# Patient Record
Sex: FEMALE | Race: WHITE | HISPANIC OR LATINO | Employment: FULL TIME | URBAN - METROPOLITAN AREA
[De-identification: names, ages, dates, MRNs, and addresses within clinical notes are randomized per-mention and may not be internally consistent; named-entity substitution may affect disease eponyms.]

---

## 2020-01-28 ENCOUNTER — TELEPHONE (OUTPATIENT)
Dept: SCHEDULING | Facility: IMAGING CENTER | Age: 35
End: 2020-01-28

## 2020-01-31 ENCOUNTER — OFFICE VISIT (OUTPATIENT)
Dept: URGENT CARE | Facility: PHYSICIAN GROUP | Age: 35
End: 2020-01-31
Payer: COMMERCIAL

## 2020-01-31 VITALS
WEIGHT: 232.2 LBS | HEIGHT: 62 IN | DIASTOLIC BLOOD PRESSURE: 90 MMHG | BODY MASS INDEX: 42.73 KG/M2 | TEMPERATURE: 98.4 F | SYSTOLIC BLOOD PRESSURE: 142 MMHG | HEART RATE: 80 BPM | RESPIRATION RATE: 18 BRPM | OXYGEN SATURATION: 99 %

## 2020-01-31 DIAGNOSIS — R53.83 FATIGUE, UNSPECIFIED TYPE: ICD-10-CM

## 2020-01-31 DIAGNOSIS — R55 SYNCOPE, UNSPECIFIED SYNCOPE TYPE: ICD-10-CM

## 2020-01-31 PROCEDURE — 99204 OFFICE O/P NEW MOD 45 MIN: CPT | Performed by: PHYSICIAN ASSISTANT

## 2020-01-31 RX ORDER — DROSPIRENONE AND ETHINYL ESTRADIOL 0.03MG-3MG
1 KIT ORAL DAILY
COMMUNITY
End: 2021-05-11

## 2020-02-01 NOTE — PROGRESS NOTES
"Subjective:      Samantha Benavides is a 34 y.o. female who presents with Faint (fatigue, x1 week )    PMH: Reviewed with patient/family member/EPIC.   MEDS:   Current Outpatient Medications:   •  drospirenone-ethinyl estradiol (HAIDER) 3-0.03 MG per tablet, Take 1 Tab by mouth every day., Disp: , Rfl:   ALLERGIES:   Allergies   Allergen Reactions   • Strawberry Anaphylaxis and Hives     SURGHX: No past surgical history on file.  SOCHX:    FH: Reviewed with patient, not pertinent to this visit.           Patient presents with:  Faint: fatigue, x1 week   Pt presents to clinic today with complaint of fainting while riding in her car one week  ago. Pt SO reports that [place] [address] when she noticed that patient appeared to be sleeping but was unable to wake her.  She states this persisted for one hour, then patient began \"looking normal\" again and was talking.  Pt states she has some recollection of this event.  Pt reports history of anemia from very heavy periods and has required blood transfusion in the past due to this anemia.  Pt denies CP or SOB at rest but does endorse GARCIA.  Pt here for \"complete work-up\".      Syncope   This is a new problem. Episode onset: one week ago. Episode frequency: once. The problem has been resolved. She lost consciousness for a period of greater than 5 minutes. The symptoms are aggravated by unknown factors. Associated symptoms include malaise/fatigue. Pertinent negatives include no abdominal pain, aura, chest pain, confusion, diaphoresis, dizziness, fever, headaches, nausea, slurred speech or vomiting. She has tried nothing for the symptoms. There is no history of arrhythmia, CAD, a clotting disorder, CVA, DM, HTN, seizures, a sudden death in family, TIA or vertigo.       Review of Systems   Constitutional: Positive for malaise/fatigue. Negative for diaphoresis and fever.   Cardiovascular: Positive for syncope. Negative for chest pain.   Gastrointestinal: " "Negative for abdominal pain, diarrhea, nausea and vomiting.   Neurological: Negative for dizziness and headaches.   Psychiatric/Behavioral: Negative for confusion.   All other systems reviewed and are negative.         Objective:     /90 (BP Location: Left arm, Patient Position: Sitting, BP Cuff Size: Adult)   Pulse 80   Temp 36.9 °C (98.4 °F) (Temporal)   Resp 18   Ht 1.575 m (5' 2\")   Wt 105.3 kg (232 lb 3.2 oz)   SpO2 99%   BMI 42.47 kg/m²      Physical Exam  Vitals signs and nursing note reviewed. Exam conducted with a chaperone present.   Constitutional:       General: She is not in acute distress.     Appearance: Normal appearance. She is well-developed. She is obese. She is not ill-appearing, toxic-appearing or diaphoretic.   HENT:      Head: Normocephalic and atraumatic.      Right Ear: Tympanic membrane normal.      Left Ear: Tympanic membrane normal.      Nose: Nose normal.      Mouth/Throat:      Lips: Pink.      Mouth: Mucous membranes are moist.      Pharynx: Oropharynx is clear. Uvula midline.   Eyes:      Extraocular Movements: Extraocular movements intact.      Conjunctiva/sclera: Conjunctivae normal.      Pupils: Pupils are equal, round, and reactive to light.   Neck:      Musculoskeletal: Normal range of motion and neck supple.   Cardiovascular:      Rate and Rhythm: Normal rate and regular rhythm.      Pulses: Normal pulses.      Heart sounds: Normal heart sounds.   Pulmonary:      Effort: Pulmonary effort is normal.      Breath sounds: Normal breath sounds.   Abdominal:      General: Bowel sounds are normal.      Palpations: Abdomen is soft.   Musculoskeletal: Normal range of motion.   Skin:     General: Skin is warm and dry.      Capillary Refill: Capillary refill takes less than 2 seconds.   Neurological:      General: No focal deficit present.      Mental Status: She is alert and oriented to person, place, and time.      Gait: Gait normal.   Psychiatric:         Mood and Affect: " "Mood normal.            EKG Interpretation   Interpreted by me   Rhythm: normal sinus   Rate: normal   Axis: normal   Ectopy: none   Conduction: normal   ST Segments: no acute change   T Waves: no acute change   Q Waves: none   Clinical Impression: no acute changes and normal EKG    Scanned into chart   Assessment/Plan:     1. Syncope, unspecified syncope type  EKG - Clinic Performed   2. Fatigue, unspecified type  EKG - Clinic Performed       After EKG was performed, pt was immediately referred to ER for further evaluation as the Urgent Care is not the appropriate clinic for a \"complete workup\" of this issue.  PT significant other will drive pt to [facility] for evaluation.     PT requires evaluation and treatment at a facility that can provide a higher level of care due to acuity of illness/complaint.     PT declined [facility] transport.    PT should follow up with PCP in 1-2 days for re-evaluation if symptoms have not improved.  Discussed red flags and reasons to return to UC or ED.  Pt and/or family verbalized understanding of diagnosis and follow up instructions and was offered informational handout on diagnosis.  PT discharged.     "

## 2020-02-04 ASSESSMENT — ENCOUNTER SYMPTOMS
FEVER: 0
DIAPHORESIS: 0
SYNCOPE: 1
SLURRED SPEECH: 0
DIZZINESS: 0
AURA: 0
NAUSEA: 0
ABDOMINAL PAIN: 0
DIARRHEA: 0
CONFUSION: 0
VOMITING: 0
HEADACHES: 0

## 2020-02-13 ENCOUNTER — OFFICE VISIT (OUTPATIENT)
Dept: URGENT CARE | Facility: MEDICAL CENTER | Age: 35
End: 2020-02-13
Payer: COMMERCIAL

## 2020-02-13 VITALS
BODY MASS INDEX: 42.33 KG/M2 | HEART RATE: 90 BPM | HEIGHT: 62 IN | TEMPERATURE: 98.8 F | DIASTOLIC BLOOD PRESSURE: 78 MMHG | SYSTOLIC BLOOD PRESSURE: 124 MMHG | WEIGHT: 230 LBS | RESPIRATION RATE: 16 BRPM | OXYGEN SATURATION: 98 %

## 2020-02-13 DIAGNOSIS — J06.9 URI WITH COUGH AND CONGESTION: Primary | ICD-10-CM

## 2020-02-13 DIAGNOSIS — R06.2 WHEEZING: ICD-10-CM

## 2020-02-13 PROCEDURE — 99214 OFFICE O/P EST MOD 30 MIN: CPT | Performed by: PHYSICIAN ASSISTANT

## 2020-02-13 RX ORDER — PROMETHAZINE HYDROCHLORIDE AND CODEINE PHOSPHATE 6.25; 1 MG/5ML; MG/5ML
5 SYRUP ORAL 4 TIMES DAILY PRN
Qty: 120 ML | Refills: 0 | Status: SHIPPED | OUTPATIENT
Start: 2020-02-13 | End: 2020-02-17

## 2020-02-13 RX ORDER — METHYLPREDNISOLONE 4 MG/1
TABLET ORAL
Qty: 21 TAB | Refills: 0 | Status: SHIPPED | OUTPATIENT
Start: 2020-02-13 | End: 2020-02-17

## 2020-02-13 RX ORDER — DOXYCYCLINE HYCLATE 100 MG
100 TABLET ORAL 2 TIMES DAILY
Qty: 14 TAB | Refills: 0 | Status: SHIPPED | OUTPATIENT
Start: 2020-02-13 | End: 2020-02-20

## 2020-02-14 PROBLEM — E28.2 PCOS (POLYCYSTIC OVARIAN SYNDROME): Status: ACTIVE | Noted: 2018-08-03

## 2020-02-14 PROBLEM — E88.819 INSULIN RESISTANCE: Status: ACTIVE | Noted: 2018-08-03

## 2020-02-14 RX ORDER — LORAZEPAM 1 MG/1
1 TABLET ORAL
COMMUNITY
Start: 2019-01-31 | End: 2020-07-02

## 2020-02-14 RX ORDER — FERROUS SULFATE 325(65) MG
325 TABLET ORAL
COMMUNITY
Start: 2019-03-09 | End: 2022-04-19

## 2020-02-14 RX ORDER — HYDROCODONE BITARTRATE AND ACETAMINOPHEN 5; 325 MG/1; MG/1
1-2 TABLET ORAL
COMMUNITY
Start: 2019-01-31 | End: 2020-02-24

## 2020-02-14 NOTE — PROGRESS NOTES
Subjective:      Pt is a 34 y.o. female who presents with Cough (x2days, body chills, loss of voice, non-productive cough, chest congestion)            HPI  This is a new problem. PT presents to  clinic today complaining of sore throat, fevers, chills, body aches, watery eyes, pressure in ears, cough, fatigue, runny nose. PT denies CP, SOB, NVD, abdominal pain, joint pain. PT states these symptoms began around 2 days ago. PT states the pain is a 7/10, aching in nature and worse at night.  Pt has not taken any RX medications for this condition. The pt's medication list, problem list, and allergies have been evaluated and reviewed during today's visit.      PMH:  Negative per pt.      PSH:  Negative per pt.      Fam Hx:  the patient's family history is not pertinent to their current complaint      Soc HX:  Social History     Socioeconomic History   • Marital status: Single     Spouse name: Not on file   • Number of children: Not on file   • Years of education: Not on file   • Highest education level: Not on file   Occupational History   • Not on file   Social Needs   • Financial resource strain: Not on file   • Food insecurity     Worry: Not on file     Inability: Not on file   • Transportation needs     Medical: Not on file     Non-medical: Not on file   Tobacco Use   • Smoking status: Never Smoker   • Smokeless tobacco: Never Used   Substance and Sexual Activity   • Alcohol use: Not on file   • Drug use: Not on file   • Sexual activity: Not on file   Lifestyle   • Physical activity     Days per week: Not on file     Minutes per session: Not on file   • Stress: Not on file   Relationships   • Social connections     Talks on phone: Not on file     Gets together: Not on file     Attends Zoroastrianism service: Not on file     Active member of club or organization: Not on file     Attends meetings of clubs or organizations: Not on file     Relationship status: Not on file   • Intimate partner violence     Fear of current  "or ex partner: Not on file     Emotionally abused: Not on file     Physically abused: Not on file     Forced sexual activity: Not on file   Other Topics Concern   • Not on file   Social History Narrative   • Not on file         Medications:    Current Outpatient Medications:   •  doxycycline (VIBRAMYCIN) 100 MG Tab, Take 1 Tab by mouth 2 times a day for 7 days., Disp: 14 Tab, Rfl: 0  •  methylPREDNISolone (MEDROL DOSEPAK) 4 MG Tablet Therapy Pack, Follow schedule on package instructions., Disp: 21 Tab, Rfl: 0  •  promethazine-codeine (PHENERGAN-CODEINE) 6.25-10 MG/5ML Syrup, Take 5 mL by mouth 4 times a day as needed for up to 7 days., Disp: 120 mL, Rfl: 0  •  drospirenone-ethinyl estradiol (HAIDER) 3-0.03 MG per tablet, Take 1 Tab by mouth every day., Disp: , Rfl:       Allergies:  Strawberry    ROS    Constitutional: Positive for chills, fevers, body aches and malaise/fatigue.   HENT: Positive for congestion and sore throat. Negative for ear pain.    Eyes: Negative for blurred vision, double vision and photophobia.   Respiratory: Positive for cough and sputum production. Negative for hemoptysis, shortness of breath and wheezing.    Cardiovascular: Negative for chest pain and palpitations.   Gastrointestinal: Negative for nausea, vomiting, abdominal pain, diarrhea and constipation.   Genitourinary: Negative for dysuria and flank pain.   Musculoskeletal: Negative for falls and myalgias.   Skin: Negative for itching and rash.   Neurological: Positive for headaches. Negative for dizziness and tingling.   Endo/Heme/Allergies: Does not bruise/bleed easily.   Psychiatric/Behavioral: Negative for depression. The patient is not nervous/anxious.           Objective:     /78 (BP Location: Left arm, Patient Position: Sitting, BP Cuff Size: Adult)   Pulse 90   Temp 37.1 °C (98.8 °F) (Temporal)   Resp 16   Ht 1.575 m (5' 2\")   Wt 104.3 kg (230 lb)   SpO2 98%   BMI 42.07 kg/m²      Physical Exam      Constitutional: " PT is oriented to person, place, and time. PT appears well-developed and well-nourished. No distress.   HENT:   Head: Normocephalic and atraumatic.   Right Ear: Hearing, tympanic membrane, external ear and ear canal normal.   Left Ear: Hearing, tympanic membrane, external ear and ear canal normal.   Nose: Mucosal edema, rhinorrhea and sinus tenderness present. Right sinus exhibits frontal sinus tenderness. Left sinus exhibits frontal sinus tenderness.   Mouth/Throat: Uvula is midline. Mucous membranes are pale. Posterior oropharyngeal edema and posterior oropharyngeal erythema with exudate noted on exam.   Eyes: Conjunctivae normal and EOM are normal. Pupils are equal, round, and reactive to light.   Neck: Normal range of motion. Neck supple. No thyromegaly present.   Cardiovascular: Normal rate, regular rhythm, normal heart sounds and intact distal pulses.  Exam reveals no gallop and no friction rub.    No murmur heard.  Pulmonary/Chest: Effort normal and breath sounds normal. No respiratory distress. PT has no wheezes. PT has no rales. PT exhibits no tenderness.   Abdominal: Soft. Bowel sounds are normal. PT exhibits no distension and no mass. There is no tenderness. There is no rebound and no guarding.   Musculoskeletal: Normal range of motion. PT exhibits no edema and no tenderness.   Lymphadenopathy:     PT has no cervical adenopathy.   Neurological: PT is alert and oriented to person, place, and time. PT displays normal reflexes. No cranial nerve deficit. PT exhibits normal muscle tone. Coordination normal.   Skin: Skin is warm and dry. No rash noted. No erythema.   Psychiatric: PT has a normal mood and affect. PT behavior is normal. Judgment and thought content normal.          Assessment/Plan:       1. URI with cough and congestion    - doxycycline (VIBRAMYCIN) 100 MG Tab; Take 1 Tab by mouth 2 times a day for 7 days.  Dispense: 14 Tab; Refill: 0  - methylPREDNISolone (MEDROL DOSEPAK) 4 MG Tablet Therapy  Pack; Follow schedule on package instructions.  Dispense: 21 Tab; Refill: 0  - promethazine-codeine (PHENERGAN-CODEINE) 6.25-10 MG/5ML Syrup; Take 5 mL by mouth 4 times a day as needed for up to 7 days.  Dispense: 120 mL; Refill: 0    2. Wheezing    PT asking for contingency plan with antibiotic therapy  Rest, fluids encouraged.  OTC decongestant for congestion/cough  AVS with medical info given.  Pt was in full understanding and agreement with the plan.  Differential diagnosis, natural history, supportive care, and indications for immediate follow-up discussed. All questions answered. Patient agrees with the plan of care.  Follow-up as needed if symptoms worsen or fail to improve to PCP, Urgent care or Emergency Room.

## 2020-02-17 ENCOUNTER — HOSPITAL ENCOUNTER (OUTPATIENT)
Dept: RADIOLOGY | Facility: MEDICAL CENTER | Age: 35
End: 2020-02-17
Attending: PHYSICIAN ASSISTANT
Payer: COMMERCIAL

## 2020-02-17 ENCOUNTER — OFFICE VISIT (OUTPATIENT)
Dept: URGENT CARE | Facility: MEDICAL CENTER | Age: 35
End: 2020-02-17
Payer: COMMERCIAL

## 2020-02-17 VITALS
DIASTOLIC BLOOD PRESSURE: 82 MMHG | WEIGHT: 223 LBS | RESPIRATION RATE: 18 BRPM | OXYGEN SATURATION: 96 % | TEMPERATURE: 99.2 F | SYSTOLIC BLOOD PRESSURE: 126 MMHG | BODY MASS INDEX: 40.79 KG/M2 | HEART RATE: 98 BPM

## 2020-02-17 DIAGNOSIS — R05.9 COUGH: ICD-10-CM

## 2020-02-17 PROCEDURE — 71046 X-RAY EXAM CHEST 2 VIEWS: CPT

## 2020-02-17 PROCEDURE — 99214 OFFICE O/P EST MOD 30 MIN: CPT | Performed by: PHYSICIAN ASSISTANT

## 2020-02-17 RX ORDER — PREDNISONE 20 MG/1
TABLET ORAL
Qty: 10 TAB | Refills: 0 | Status: SHIPPED | OUTPATIENT
Start: 2020-02-17 | End: 2020-02-24

## 2020-02-17 RX ORDER — BENZONATATE 200 MG/1
200 CAPSULE ORAL 3 TIMES DAILY PRN
Qty: 30 CAP | Refills: 0 | Status: SHIPPED | OUTPATIENT
Start: 2020-02-17 | End: 2020-02-24

## 2020-02-17 NOTE — LETTER
February 17, 2020         Patient: Samantha Benavides   YOB: 1985   Date of Visit: 2/17/2020           To Whom it May Concern:    Samantha Benavides was seen in my clinic on 2/17/2020. Please excuse her from work 2/18-2/20/2020.    If you have any questions or concerns, please don't hesitate to call.        Sincerely,           Ari Brar P.A.-C.  Electronically Signed

## 2020-02-18 NOTE — PATIENT INSTRUCTIONS
Bronchospasm, Adult  A bronchospasm is a spasm or tightening of the airways going into the lungs. During a bronchospasm breathing becomes more difficult because the airways get smaller. When this happens there can be coughing, a whistling sound when breathing (wheezing), and difficulty breathing. Bronchospasm is often associated with asthma, but not all patients who experience a bronchospasm have asthma.  What are the causes?  A bronchospasm is caused by inflammation or irritation of the airways. The inflammation or irritation may be triggered by:  · Allergies (such as to animals, pollen, food, or mold). Allergens that cause bronchospasm may cause wheezing immediately after exposure or many hours later.  · Infection. Viral infections are believed to be the most common cause of bronchospasm.  · Exercise.  · Irritants (such as pollution, cigarette smoke, strong odors, aerosol sprays, and paint fumes).  · Weather changes. Winds increase molds and pollens in the air. Rain refreshes the air by washing irritants out. Cold air may cause inflammation.  · Stress and emotional upset.  What are the signs or symptoms?  · Wheezing.  · Excessive nighttime coughing.  · Frequent or severe coughing with a simple cold.  · Chest tightness.  · Shortness of breath.  How is this diagnosed?  Bronchospasm is usually diagnosed through a history and physical exam. Tests, such as chest X-rays, are sometimes done to look for other conditions.  How is this treated?  · Inhaled medicines can be given to open up your airways and help you breathe. The medicines can be given using either an inhaler or a nebulizer machine.  · Corticosteroid medicines may be given for severe bronchospasm, usually when it is associated with asthma.  Follow these instructions at home:  · Always have a plan prepared for seeking medical care. Know when to call your health care provider and local emergency services (911 in the [place] Know where you can access local  emergency care.  · Only take medicines as directed by your health care provider.  · If you were prescribed an inhaler or nebulizer machine, ask your health care provider to explain how to use it correctly. Always use a spacer with your inhaler if you were given one.  · It is necessary to remain calm during an attack. Try to relax and breathe more slowly.  · Control your home environment in the following ways:  ¨ Change your heating and air conditioning filter at least once a month.  ¨ Limit your use of fireplaces and wood stoves.  ¨ Do not smoke and do not allow smoking in your home.  ¨ Avoid exposure to perfumes and fragrances.  ¨ Get rid of pests (such as roaches and mice) and their droppings.  ¨ Throw away plants if you see mold on them.  ¨ Keep your house clean and dust free.  ¨ Replace carpet with wood, tile, or vinyl diamond. Carpet can trap dander and dust.  ¨ Use allergy-proof pillows, mattress covers, and box spring covers.  ¨ Wash bed sheets and blankets every week in hot water and dry them in a dryer.  ¨ Use blankets that are made of polyester or cotton.  ¨ Wash hands frequently.  Contact a health care provider if:  · You have muscle aches.  · You have chest pain.  · The sputum changes from clear or white to yellow, green, gray, or bloody.  · The sputum you cough up gets thicker.  · There are problems that may be related to the medicine you are given, such as a rash, itching, swelling, or trouble breathing.  Get help right away if:  · You have worsening wheezing and coughing even after taking your prescribed medicines.  · You have increased difficulty breathing.  · You develop severe chest pain.  This information is not intended to replace advice given to you by your health care provider. Make sure you discuss any questions you have with your health care provider.  Document Released: 12/20/2004 Document Revised: 05/25/2017 Document Reviewed: 06/09/2014  [employer] Interactive Patient Education © 2017  [employer]

## 2020-02-20 ASSESSMENT — ENCOUNTER SYMPTOMS
PALPITATIONS: 0
COUGH: 1
SHORTNESS OF BREATH: 0
SORE THROAT: 1
HEMOPTYSIS: 0
WHEEZING: 0
MYALGIAS: 1
FEVER: 0
SPUTUM PRODUCTION: 0
CHILLS: 0

## 2020-02-20 NOTE — PROGRESS NOTES
Subjective:      Samantha Benavides is a 34 y.o. female who presents with Cough (cough getting worse, seen 13th, still taking what was prescribed)            Cough   This is a new problem. The current episode started in the past 7 days. The problem has been unchanged. The cough is non-productive. Associated symptoms include myalgias, nasal congestion and a sore throat. Pertinent negatives include no chest pain, chills, ear pain, fever, hemoptysis, shortness of breath or wheezing. Nothing aggravates the symptoms. She has tried OTC cough suppressant for the symptoms.       Review of Systems   Constitutional: Positive for malaise/fatigue. Negative for chills and fever.   HENT: Positive for congestion and sore throat. Negative for ear pain.    Respiratory: Positive for cough. Negative for hemoptysis, sputum production, shortness of breath and wheezing.    Cardiovascular: Negative for chest pain and palpitations.   Musculoskeletal: Positive for myalgias.   All other systems reviewed and are negative.    PMH:  has no past medical history on file.  MEDS:   Current Outpatient Medications:   •  predniSONE (DELTASONE) 20 MG Tab, Take 40 mg by mouth once daily for 5 days., Disp: 10 Tab, Rfl: 0  •  Hydrocod Polst-CPM Polst ER (TUSSIONEX) 10-8 MG/5ML Suspension Extended Release, Take 5 mL by mouth every 12 hours for 12 days., Disp: 115 mL, Rfl: 0  •  benzonatate (TESSALON) 200 MG capsule, Take 1 Cap by mouth 3 times a day as needed for Cough., Disp: 30 Cap, Rfl: 0  •  LORazepam (ATIVAN) 1 MG Tab, Take 1 mg by mouth., Disp: , Rfl:   •  HYDROcodone-acetaminophen (NORCO) 5-325 MG Tab per tablet, Take 1-2 Tabs by mouth., Disp: , Rfl:   •  ferrous sulfate 325 (65 Fe) MG tablet, Take 325 mg by mouth., Disp: , Rfl:   •  doxycycline (VIBRAMYCIN) 100 MG Tab, Take 1 Tab by mouth 2 times a day for 7 days., Disp: 14 Tab, Rfl: 0  •  drospirenone-ethinyl estradiol (HAIDER) 3-0.03 MG per tablet, Take 1 Tab by mouth every day., Disp: , Rfl:    ALLERGIES:   Allergies   Allergen Reactions   • Strawberry Anaphylaxis and Hives     SURGHX: History reviewed. No pertinent surgical history.  SOCHX:  reports that she has never smoked. She has never used smokeless tobacco.  FH: Family history was reviewed, no pertinent findings to report  Medications, Allergies, and current problem list reviewed today in Epic       Objective:     Blood Pressure 126/82   Pulse 98   Temperature 37.3 °C (99.2 °F) (Temporal)   Respiration 18   Weight 101.2 kg (223 lb)   Oxygen Saturation 96%   Body Mass Index 40.79 kg/m²      Physical Exam  Vitals signs reviewed.   Constitutional:       General: She is not in acute distress.     Appearance: She is well-developed. She is not ill-appearing or toxic-appearing.      Interventions: She is not intubated.  HENT:      Head: Normocephalic and atraumatic.      Right Ear: Hearing, tympanic membrane, ear canal and external ear normal.      Left Ear: Hearing, tympanic membrane, ear canal and external ear normal.      Nose: Nose normal.      Mouth/Throat:      Pharynx: Uvula midline.   Eyes:      General: Lids are normal.      Conjunctiva/sclera: Conjunctivae normal.   Neck:      Musculoskeletal: Full passive range of motion without pain, normal range of motion and neck supple.   Cardiovascular:      Rate and Rhythm: Regular rhythm.      Heart sounds: Normal heart sounds, S1 normal and S2 normal. No murmur. No friction rub. No gallop.    Pulmonary:      Effort: Pulmonary effort is normal. No tachypnea, bradypnea, accessory muscle usage or respiratory distress. She is not intubated.      Breath sounds: Normal breath sounds. No decreased breath sounds, wheezing, rhonchi or rales.   Chest:      Chest wall: No tenderness.   Musculoskeletal: Normal range of motion.   Skin:     General: Skin is warm and dry.   Neurological:      Mental Status: She is alert and oriented to person, place, and time.   Psychiatric:         Speech: Speech normal.          Behavior: Behavior normal.         Thought Content: Thought content normal.         Judgment: Judgment normal.            HISTORY/REASON FOR EXAM:  Cough     TECHNIQUE/EXAM DESCRIPTION:  PA and lateral views of the chest.     COMPARISON:  None     FINDINGS:     The heart is not enlarged. No focal consolidation, pleural effusion or pneumothorax is identified.  Costophrenic angles are clear.           IMPRESSION:     No acute cardiopulmonary process is identified.     Assessment/Plan:   Patient is a 34-year-old female who presents for evaluation of cough.  She was seen 1 week ago for this issue and was diagnosed with a URI.  She was prescribed antibiotics and states that she is not feeling better.  She denies shortness of breath or chest pain.  Vitals within normal limits.  Lungs are clear to auscultation.  Chest x-ray shows no acute cardiopulmonary process.  Likely continuing cough from URI.  Discussed that cough may last up to 4 weeks.  Prescription of cough suppressants and steroids sent to pharmacy.    1. Cough    - DX-CHEST-2 VIEWS; Future  - predniSONE (DELTASONE) 20 MG Tab; Take 40 mg by mouth once daily for 5 days.  Dispense: 10 Tab; Refill: 0  - Hydrocod Polst-CPM Polst ER (TUSSIONEX) 10-8 MG/5ML Suspension Extended Release; Take 5 mL by mouth every 12 hours for 12 days.  Dispense: 115 mL; Refill: 0  - benzonatate (TESSALON) 200 MG capsule; Take 1 Cap by mouth 3 times a day as needed for Cough.  Dispense: 30 Cap; Refill: 0    Differential diagnosis, natural history, supportive care discussed. Follow-up with primary care provider within 7-10 days, emergency room precautions discussed.  Patient and/or family appears understanding of information.  Handout and review of patients diagnosis and treatment was discussed extensively.

## 2020-02-24 ENCOUNTER — OFFICE VISIT (OUTPATIENT)
Dept: MEDICAL GROUP | Facility: LAB | Age: 35
End: 2020-02-24
Payer: COMMERCIAL

## 2020-02-24 VITALS
RESPIRATION RATE: 16 BRPM | DIASTOLIC BLOOD PRESSURE: 78 MMHG | OXYGEN SATURATION: 96 % | WEIGHT: 228 LBS | BODY MASS INDEX: 40.4 KG/M2 | SYSTOLIC BLOOD PRESSURE: 114 MMHG | HEIGHT: 63 IN | TEMPERATURE: 96.7 F | HEART RATE: 78 BPM

## 2020-02-24 DIAGNOSIS — H53.9 VISION CHANGES: Primary | ICD-10-CM

## 2020-02-24 DIAGNOSIS — F32.1 CURRENT MODERATE EPISODE OF MAJOR DEPRESSIVE DISORDER, UNSPECIFIED WHETHER RECURRENT (HCC): ICD-10-CM

## 2020-02-24 DIAGNOSIS — D50.0 IRON DEFICIENCY ANEMIA DUE TO CHRONIC BLOOD LOSS: ICD-10-CM

## 2020-02-24 DIAGNOSIS — F41.9 ANXIETY: ICD-10-CM

## 2020-02-24 DIAGNOSIS — E28.2 PCOS (POLYCYSTIC OVARIAN SYNDROME): ICD-10-CM

## 2020-02-24 PROCEDURE — 99214 OFFICE O/P EST MOD 30 MIN: CPT | Performed by: FAMILY MEDICINE

## 2020-02-24 RX ORDER — LORAZEPAM 1 MG/1
1 TABLET ORAL EVERY 4 HOURS PRN
Qty: 2 TAB | Refills: 0 | Status: SHIPPED | OUTPATIENT
Start: 2020-02-24 | End: 2020-02-25

## 2020-02-24 SDOH — HEALTH STABILITY: MENTAL HEALTH: HOW MANY STANDARD DRINKS CONTAINING ALCOHOL DO YOU HAVE ON A TYPICAL DAY?: 1 OR 2

## 2020-02-24 SDOH — HEALTH STABILITY: MENTAL HEALTH: HOW OFTEN DO YOU HAVE A DRINK CONTAINING ALCOHOL?: MONTHLY OR LESS

## 2020-02-24 SDOH — HEALTH STABILITY: MENTAL HEALTH: HOW OFTEN DO YOU HAVE 6 OR MORE DRINKS ON ONE OCCASION?: NEVER

## 2020-02-24 ASSESSMENT — ENCOUNTER SYMPTOMS
BLOOD IN STOOL: 0
EYE REDNESS: 0
EYE PAIN: 0
CHILLS: 0
DIARRHEA: 0
VOMITING: 0
CONSTIPATION: 0
EYE DISCHARGE: 0
WHEEZING: 0
BLURRED VISION: 1
NAUSEA: 0
SHORTNESS OF BREATH: 0
ABDOMINAL PAIN: 0
PHOTOPHOBIA: 0
PALPITATIONS: 0
DOUBLE VISION: 0
FEVER: 0

## 2020-02-24 ASSESSMENT — PATIENT HEALTH QUESTIONNAIRE - PHQ9
SUM OF ALL RESPONSES TO PHQ QUESTIONS 1-9: 10
CLINICAL INTERPRETATION OF PHQ2 SCORE: 2
5. POOR APPETITE OR OVEREATING: 1 - SEVERAL DAYS

## 2020-02-24 NOTE — PROGRESS NOTES
"Samantha Benavides is a 34 y.o. female here for   Chief Complaint   Patient presents with   • Establish Care      Scared about a autoimmunedisease she may have from her mother \"Susac's\"        HPI:  Samantha is a very pleasant 34 y.o. female.     Mother diagnosed 2006     #Vision Changes  -Patient states that in the last several months she has had difficulty with her vision.  She states that at times she will have what she described as \"pixelated\" images, especially if people.  She states when staring at darker colors or blacks she will lose her depth perception.  -Has been seen by optometrist and has glasses that she wears for driving and other necessary activities.  -Denies any eye pain, floaters, trauma to the eyes.  -Patient does state that her mother was recently diagnosed with Susac's disease and scarring that she to might be developing this as well.  -Denies any jaw claudication, increased headaches, fevers, chills, night sweats, unanticipated weight loss.  -Patient does states she has a history of migraines, occurring approximately once every month.  -Denies any other rashes or lesions on body.    #PCOS   -Longstanding history of PCO S.  She states that diagnosed with PCOS led to several episodes of Salcedo menorrhagia where she had significant amount of bleeding.  2 years ago bleeding was bad enough that required blood transfusion.  Patient continues use iron supplementation.  -Has been on OCPs for control of periods for some time now which she states is helped significantly.  -Denies any excessive bleeding, cramping, changes in mood.    #Anemia:  -Most likely secondary to blood loss given her history of PCOS and mini menorrhagia.  Currently taking iron supplements    #Major depressive disorder:  -Patient states that she has previously been diagnosed with depression; however, is never treated or been on any medication for treatment thereof.  She states that she is doing well.  Continues to have some bouts of " "\"sadness\" but overall feels like she is well controlled.  Discussing her PHQ 9 score with her and discussing treatment including pharmacologic as well as counseling patient refused both at this time.  -Patient denies any suicidal/homicidal ideations.    #Anxiety:  -Patient states is situational anxiety that occurs when having to get blood drawn.  Patient is requesting dose of Ativan for upcoming blood draw.    Current medicines (including changes today)  Current Outpatient Medications   Medication Sig Dispense Refill   • LORazepam (ATIVAN) 1 MG Tab Take 1 mg by mouth.     • ferrous sulfate 325 (65 Fe) MG tablet Take 325 mg by mouth.     • drospirenone-ethinyl estradiol (HAIDER) 3-0.03 MG per tablet Take 1 Tab by mouth every day.       No current facility-administered medications for this visit.      She  has a past medical history of Allergy, Anemia, Anxiety, Blood transfusion without reported diagnosis (2018), Head ache, Migraine, and Urinary tract infection.  She  has no past surgical history on file.  Social History     Tobacco Use   • Smoking status: Never Smoker   • Smokeless tobacco: Never Used   Substance Use Topics   • Alcohol use: Not Currently     Frequency: Monthly or less     Drinks per session: 1 or 2     Binge frequency: Never   • Drug use: Never     Social History     Social History Narrative   • Not on file     Family History   Problem Relation Age of Onset   • Migraines Mother    • Autoimmune Disease Mother 42        Susac's    • No Known Problems Father    • No Known Problems Sister    • No Known Problems Brother      Family Status   Relation Name Status   • Mo  Alive   • Fa  Alive   • Sis  Alive   • Bro  Alive     ROS  Review of Systems   Constitutional: Negative for chills and fever.   HENT: Negative for hearing loss.    Eyes: Positive for blurred vision. Negative for double vision, photophobia, pain, discharge and redness.   Respiratory: Negative for shortness of breath and wheezing.  " "  Cardiovascular: Negative for chest pain and palpitations.   Gastrointestinal: Negative for abdominal pain, blood in stool, constipation, diarrhea, nausea and vomiting.   Skin: Negative for rash.   All other systems reviewed and are negative.       Objective:     /78 (BP Location: Right arm, Patient Position: Sitting, BP Cuff Size: Adult)   Pulse 78   Temp 35.9 °C (96.7 °F) (Temporal)   Resp 16   Ht 1.6 m (5' 3\")   Wt 103.4 kg (228 lb)   SpO2 96%  Body mass index is 40.39 kg/m².  Physical Exam:    Constitutional: Alert, no distress.  Skin: Warm, dry, good turgor, no rashes in visible areas.  Eye: Equal, round and reactive, conjunctiva clear, lids normal.  ENMT: Lips without lesions, good dentition, oropharynx clear. TM's pearly gray with normal light reflexes bilaterally  Neck: Trachea midline, no masses, no thyromegaly. No cervical or supraclavicular lymphadenopathy.  Respiratory: Unlabored respiratory effort, lungs clear to auscultation bilaterally, no wheezes, rales, or ronchi.  Cardiovascular: Normal S1, S2, RRR, no murmur, no edema.  Abdomen: Soft, non-tender, no masses, no hepatosplenomegaly.  Psych: Alert and oriented x3, normal affect and mood.      Assessment and Plan:   The following treatment plan was discussed    1. Vision changes  -Unsure etiology at this time.  Fundal examination was unremarkable today.  Did not check on Snellen chart.  However, given her concerns regarding her family history of vasculitis and recent vision changes we will refer to ophthalmology at this time.  -We will also check a panel of labs for signs of any autoimmune disease.  - Sed Rate; Future  - CRP QUANTITIVE (NON-CARDIAC); Future  - Comp Metabolic Panel; Future  - TSH WITH REFLEX TO FT4; Future  - CBC WITHOUT DIFFERENTIAL; Future  - REFERRAL TO OPHTHALMOLOGY    2. Anxiety  -Ativan tabs were given for upcoming blood draw (see above).  - LORazepam (ATIVAN) 1 MG Tab; Take 1 Tab by mouth every four hours as needed " for Anxiety for up to 1 day.  Dispense: 2 Tab; Refill: 0    3. Current moderate episode of major depressive disorder, unspecified whether recurrent (HCC)  -At this time patient does not wish to undergo any treatment for depression and she feels like the symptoms are controlled well at this time.  No suicidal/homicidal ideations.  -Patient was instructed to follow-up immediately if she feels like her symptoms are becoming out of control.  - Patient has been identified as having a positive depression screening. Appropriate orders and counseling have been given.    4. PCOS (polycystic ovarian syndrome)  -Status: Stable.  Will continue on OCPs at this time.    5. Iron deficiency anemia due to chronic blood loss  -Status: Stable.  Will continue with the iron supplementation at this time.  We will recheck blood work at this time.     Records requested.  Followup: Return in about 6 months (around 8/24/2020), or if symptoms worsen or fail to improve.         This note was created using voice recognition software. I have made every reasonable attempt to correct errors, however, I do anticipate some grammatical errors.

## 2020-06-25 ENCOUNTER — HOSPITAL ENCOUNTER (OUTPATIENT)
Dept: LAB | Facility: MEDICAL CENTER | Age: 35
End: 2020-06-25
Attending: FAMILY MEDICINE
Payer: COMMERCIAL

## 2020-06-25 LAB
ERYTHROCYTE [DISTWIDTH] IN BLOOD BY AUTOMATED COUNT: 41.7 FL (ref 35.9–50)
HCT VFR BLD AUTO: 43.4 % (ref 37–47)
HGB BLD-MCNC: 14.2 G/DL (ref 12–16)
MCH RBC QN AUTO: 28.9 PG (ref 27–33)
MCHC RBC AUTO-ENTMCNC: 32.7 G/DL (ref 33.6–35)
MCV RBC AUTO: 88.4 FL (ref 81.4–97.8)
PLATELET # BLD AUTO: 365 K/UL (ref 164–446)
PMV BLD AUTO: 10.6 FL (ref 9–12.9)
RBC # BLD AUTO: 4.91 M/UL (ref 4.2–5.4)
WBC # BLD AUTO: 7.3 K/UL (ref 4.8–10.8)

## 2020-06-25 PROCEDURE — 84443 ASSAY THYROID STIM HORMONE: CPT

## 2020-06-25 PROCEDURE — 36415 COLL VENOUS BLD VENIPUNCTURE: CPT

## 2020-06-25 PROCEDURE — 86140 C-REACTIVE PROTEIN: CPT

## 2020-06-25 PROCEDURE — 80053 COMPREHEN METABOLIC PANEL: CPT

## 2020-06-25 PROCEDURE — 85652 RBC SED RATE AUTOMATED: CPT

## 2020-06-25 PROCEDURE — 85027 COMPLETE CBC AUTOMATED: CPT

## 2020-06-26 LAB
ALBUMIN SERPL BCP-MCNC: 3.9 G/DL (ref 3.2–4.9)
ALBUMIN/GLOB SERPL: 1.1 G/DL
ALP SERPL-CCNC: 58 U/L (ref 30–99)
ALT SERPL-CCNC: 14 U/L (ref 2–50)
ANION GAP SERPL CALC-SCNC: 15 MMOL/L (ref 7–16)
AST SERPL-CCNC: 23 U/L (ref 12–45)
BILIRUB SERPL-MCNC: <0.2 MG/DL (ref 0.1–1.5)
BUN SERPL-MCNC: 8 MG/DL (ref 8–22)
CALCIUM SERPL-MCNC: 9.4 MG/DL (ref 8.5–10.5)
CHLORIDE SERPL-SCNC: 104 MMOL/L (ref 96–112)
CO2 SERPL-SCNC: 19 MMOL/L (ref 20–33)
CREAT SERPL-MCNC: 0.69 MG/DL (ref 0.5–1.4)
CRP SERPL HS-MCNC: 1.47 MG/DL (ref 0–0.75)
ERYTHROCYTE [SEDIMENTATION RATE] IN BLOOD BY WESTERGREN METHOD: 22 MM/HOUR (ref 0–20)
FASTING STATUS PATIENT QL REPORTED: NORMAL
GLOBULIN SER CALC-MCNC: 3.4 G/DL (ref 1.9–3.5)
GLUCOSE SERPL-MCNC: 85 MG/DL (ref 65–99)
POTASSIUM SERPL-SCNC: 4.3 MMOL/L (ref 3.6–5.5)
PROT SERPL-MCNC: 7.3 G/DL (ref 6–8.2)
SODIUM SERPL-SCNC: 138 MMOL/L (ref 135–145)
TSH SERPL DL<=0.005 MIU/L-ACNC: 1.89 UIU/ML (ref 0.38–5.33)

## 2020-07-02 ENCOUNTER — OFFICE VISIT (OUTPATIENT)
Dept: MEDICAL GROUP | Facility: LAB | Age: 35
End: 2020-07-02
Payer: COMMERCIAL

## 2020-07-02 VITALS
RESPIRATION RATE: 16 BRPM | HEIGHT: 63 IN | HEART RATE: 68 BPM | WEIGHT: 230 LBS | DIASTOLIC BLOOD PRESSURE: 72 MMHG | OXYGEN SATURATION: 98 % | TEMPERATURE: 97.2 F | BODY MASS INDEX: 40.75 KG/M2 | SYSTOLIC BLOOD PRESSURE: 106 MMHG

## 2020-07-02 DIAGNOSIS — G89.29 CHRONIC NONINTRACTABLE HEADACHE, UNSPECIFIED HEADACHE TYPE: ICD-10-CM

## 2020-07-02 DIAGNOSIS — H53.9 VISION CHANGES: ICD-10-CM

## 2020-07-02 DIAGNOSIS — R51.9 CHRONIC NONINTRACTABLE HEADACHE, UNSPECIFIED HEADACHE TYPE: ICD-10-CM

## 2020-07-02 DIAGNOSIS — Z83.2 FAMILY HISTORY OF AUTOIMMUNE DISORDER: ICD-10-CM

## 2020-07-02 DIAGNOSIS — F41.9 ANXIETY: ICD-10-CM

## 2020-07-02 PROCEDURE — 99214 OFFICE O/P EST MOD 30 MIN: CPT | Performed by: NURSE PRACTITIONER

## 2020-07-02 RX ORDER — LORAZEPAM 1 MG/1
1 TABLET ORAL EVERY 6 HOURS PRN
Qty: 3 TAB | Refills: 0 | Status: SHIPPED | OUTPATIENT
Start: 2020-07-02 | End: 2020-09-30 | Stop reason: SDUPTHER

## 2020-07-02 RX ORDER — SUMATRIPTAN 25 MG/1
25 TABLET, FILM COATED ORAL
Qty: 10 TAB | Refills: 3 | Status: SHIPPED | OUTPATIENT
Start: 2020-07-02 | End: 2021-05-11

## 2020-07-02 ASSESSMENT — FIBROSIS 4 INDEX: FIB4 SCORE: 0.54

## 2020-07-02 NOTE — PROGRESS NOTES
"CC  f/u    HPI   Samantha is a 34-year-old female, established patient of Dr. Cheema, here to follow-up on lab work and symptoms - new issue to me today.  Requesting a referral to a neurologist.  Concerned that she has an autoimmune disease b/c vision has a \"snow appearance,\" with black spots, light sensitivity, poor night vision (quick onset over a few years per pt) -vision changes such as snow appearance/black spots/overall daytime decrease in acuity started several months ago.  Had to recently increase size icons on computer.    Had negative w/u with ophthalmology in march tells me that she was told to see a neurologist.  Denies issues writing, typing, steering, walking.    Has headache every week lasting a few days - started elementary school.  No previous w/u/treatment for migraines.  Romeo helps occasionally.  Tells me that she is never had a CT or MRI of her brain.  Fatigued easily.  Denies n/v/d or upper abdominal pain.   Denies dysuria or hematuria.   Has chronic wrist, thumb and bilateral hip joint pain.   Has dx of endometriosis.      Social:   Massage therapist  Nonsmoker.   Rare etoh    Family:  Mom-susac syndrome    Past medical, surgical, family, and social history is reviewed and updated in Epic chart by me today.   Medications and allergies reviewed and updated in Epic chart by me today.     ROS:   Denies CP or SOB    Exam:  /72 (BP Location: Left arm, Patient Position: Sitting, BP Cuff Size: Large adult)   Pulse 68   Temp 36.2 °C (97.2 °F)   Resp 16   Ht 1.6 m (5' 3\")   Wt 104.3 kg (230 lb)   SpO2 98%   Constitutional: Alert, no distress, plus 3 vital signs  Skin:  Warm, dry, no rashes invisible areas  Eye: Equal, round and reactive, conjunctiva clear  ENMT: Lips without lesions, good dentition, oropharynx clear    Neck: Trachea midline, no masses, no thyromegaly  Respiratory: Unlabored respiration, lungs clear to auscultation, no wheezes, no rhonchi  Cardiovascular: Normal rate " and rhythm.  Psych: Alert, pleasant, well-groomed, normal affect  Neurologic: Alert and oriented. Cranial nerves II-XII intact, EOMs intact, no tongue deviation, PERRL, no facial asymmetry to motor or sensation, No focal motor deficits.  Normal station and gait, normal tandem walk. Coordination normal    Assessment / Plan / Medical Decision makin. Chronic nonintractable headache, unspecified headache type  MR-BRAIN-W/O    REFERRAL TO NEUROLOGY    SUMAtriptan (IMITREX) 25 MG Tab tablet    ANTI-NUCLEAR ANTIBODY SERUM    ANTI-DNA(DS)    MAI AB IGG    COMPLEMENT C3    COMPLEMENT C4    COMPLEMENT TOTAL (CH50)   2. Vision changes  REFERRAL TO NEUROLOGY    ANTI-NUCLEAR ANTIBODY SERUM    ANTI-DNA(DS)    MAI AB IGG    COMPLEMENT C3    COMPLEMENT C4    COMPLEMENT TOTAL (CH50)   3. Anxiety  LORazepam (ATIVAN) 1 MG Tab   4. Family history of autoimmune disorder  ANTI-NUCLEAR ANTIBODY SERUM    ANTI-DNA(DS)    MAI AB IGG    COMPLEMENT C3    COMPLEMENT C4    COMPLEMENT TOTAL (CH50)     Recommend starting with an MRI of her brain and further work-up for lupus.  Has completed ophthalmology consult as of 2020.  Referred to neurology per patient request but discussed potentially changing this to rheumatology based on upcoming lab results.  In terms of migraines, trial of abortive therapy initially with Imitrex.  Discussed how to take Imitrex as well as potential side effects.  We discussed the importance of exercise and healthy eating as well as adequate sleep.    On a side note she has extreme anxiety with needles and requires anywhere from 1 to 2 mg of lorazepam a few hours prior to going to the lab and 1 mg of lorazepam immediately prior to blood draw.  Her partner plans on driving her.  Reviewed  profile which shows that her last refill of lorazepam was several months ago through our office.  She understands that she should not drive a car or operating machinery within 6 hours of taking lorazepam.

## 2020-07-18 ENCOUNTER — HOSPITAL ENCOUNTER (OUTPATIENT)
Dept: LAB | Facility: MEDICAL CENTER | Age: 35
End: 2020-07-18
Attending: NURSE PRACTITIONER
Payer: COMMERCIAL

## 2020-07-18 DIAGNOSIS — H53.9 VISION CHANGES: ICD-10-CM

## 2020-07-18 DIAGNOSIS — R51.9 CHRONIC NONINTRACTABLE HEADACHE, UNSPECIFIED HEADACHE TYPE: ICD-10-CM

## 2020-07-18 DIAGNOSIS — G89.29 CHRONIC NONINTRACTABLE HEADACHE, UNSPECIFIED HEADACHE TYPE: ICD-10-CM

## 2020-07-18 DIAGNOSIS — Z83.2 FAMILY HISTORY OF AUTOIMMUNE DISORDER: ICD-10-CM

## 2020-07-18 LAB
C3 SERPL-MCNC: 165.5 MG/DL (ref 87–200)
C4 SERPL-MCNC: 35.6 MG/DL (ref 19–52)

## 2020-07-18 PROCEDURE — 86225 DNA ANTIBODY NATIVE: CPT

## 2020-07-18 PROCEDURE — 86235 NUCLEAR ANTIGEN ANTIBODY: CPT

## 2020-07-18 PROCEDURE — 36415 COLL VENOUS BLD VENIPUNCTURE: CPT

## 2020-07-18 PROCEDURE — 86160 COMPLEMENT ANTIGEN: CPT | Mod: 91

## 2020-07-18 PROCEDURE — 86162 COMPLEMENT TOTAL (CH50): CPT

## 2020-07-18 PROCEDURE — 86038 ANTINUCLEAR ANTIBODIES: CPT

## 2020-07-20 LAB — NUCLEAR IGG SER QL IA: NORMAL

## 2020-07-21 LAB
CH50 SERPL-ACNC: 51.1 U/ML (ref 38.7–89.9)
ENA SM IGG SER-ACNC: 2 AU/ML (ref 0–40)

## 2020-07-23 LAB — DSDNA AB TITR SER CLIF: NORMAL {TITER}

## 2020-07-25 ENCOUNTER — HOSPITAL ENCOUNTER (OUTPATIENT)
Dept: RADIOLOGY | Facility: MEDICAL CENTER | Age: 35
End: 2020-07-25
Attending: NURSE PRACTITIONER
Payer: COMMERCIAL

## 2020-07-25 DIAGNOSIS — R51.9 CHRONIC NONINTRACTABLE HEADACHE, UNSPECIFIED HEADACHE TYPE: ICD-10-CM

## 2020-07-25 DIAGNOSIS — G89.29 CHRONIC NONINTRACTABLE HEADACHE, UNSPECIFIED HEADACHE TYPE: ICD-10-CM

## 2020-07-25 PROCEDURE — 70551 MRI BRAIN STEM W/O DYE: CPT

## 2020-08-20 ENCOUNTER — OFFICE VISIT (OUTPATIENT)
Dept: NEUROLOGY | Facility: MEDICAL CENTER | Age: 35
End: 2020-08-20
Payer: COMMERCIAL

## 2020-08-20 VITALS
HEART RATE: 89 BPM | OXYGEN SATURATION: 89 % | SYSTOLIC BLOOD PRESSURE: 110 MMHG | DIASTOLIC BLOOD PRESSURE: 70 MMHG | WEIGHT: 234.79 LBS | RESPIRATION RATE: 16 BRPM | TEMPERATURE: 97.6 F | HEIGHT: 63 IN | BODY MASS INDEX: 41.6 KG/M2

## 2020-08-20 DIAGNOSIS — H53.9 VISUAL CHANGES: ICD-10-CM

## 2020-08-20 DIAGNOSIS — G43.009 MIGRAINE WITHOUT AURA AND WITHOUT STATUS MIGRAINOSUS, NOT INTRACTABLE: ICD-10-CM

## 2020-08-20 PROCEDURE — 99204 OFFICE O/P NEW MOD 45 MIN: CPT | Performed by: NURSE PRACTITIONER

## 2020-08-20 RX ORDER — TOPIRAMATE 25 MG/1
50 TABLET ORAL
Qty: 180 TAB | Refills: 3 | Status: SHIPPED | OUTPATIENT
Start: 2020-08-20 | End: 2020-09-05

## 2020-08-20 ASSESSMENT — ENCOUNTER SYMPTOMS
BRUISES/BLEEDS EASILY: 1
WHEEZING: 0
BLURRED VISION: 1
DIZZINESS: 1
NAUSEA: 1
HEARTBURN: 0
PHOTOPHOBIA: 1
VOMITING: 0
FOCAL WEAKNESS: 0
MYALGIAS: 0
INSOMNIA: 1
SEIZURES: 0
WEAKNESS: 0
SINUS PAIN: 0
NECK PAIN: 1
SHORTNESS OF BREATH: 0
FEVER: 0
DEPRESSION: 1
SENSORY CHANGE: 1
NERVOUS/ANXIOUS: 0
CHILLS: 0
BACK PAIN: 0
DIARRHEA: 0
HEADACHES: 1
CONSTIPATION: 1
COUGH: 0
DOUBLE VISION: 1
TINGLING: 1
PALPITATIONS: 0

## 2020-08-20 ASSESSMENT — FIBROSIS 4 INDEX: FIB4 SCORE: 0.54

## 2020-08-20 NOTE — PATIENT INSTRUCTIONS
Start magnesium oxide 400mg gel caps by mouth every night, may take extra dose if needed for headache (over the counter), hold for diarrhea         Start Riboflavin (Vitamin B2) 400mg by mouth every night (over the counter),may turn urine bright yellow         Start COQ 10, take 300mg every night. (over the counter)          Attempt to go to bed and get up at the same time every night           Eat meals on regular basis            Stay hydrated.             Aerobic exercise 30 minutes daily             Avoid aged or smoked foods, avoid processed foods, red wine, aged cheese              Keep headache diary, include foods that you may have eaten.             Avoid overusing over the counter medications:  Do not take more than 500mg acetaminophen (tylenol), more than 4 times weekly, more frequent or larger doses are associated with medication overuse headache.      For prevention take topiramate 25mg every night x 1 week, if you are feeling ok, you may start 50mg every night.      Migraine Headache  A migraine headache is a very strong throbbing pain on one side or both sides of your head. This type of headache can also cause other symptoms. It can last from 4 hours to 3 days. Talk with your doctor about what things may bring on (trigger) this condition.  What are the causes?  The exact cause of this condition is not known. This condition may be triggered or caused by:  · Drinking alcohol.  · Smoking.  · Taking medicines, such as:  ? Medicine used to treat chest pain (nitroglycerin).  ? Birth control pills.  ? Estrogen.  ? Some blood pressure medicines.  · Eating or drinking certain products.  · Doing physical activity.  Other things that may trigger a migraine headache include:  · Having a menstrual period.  · Pregnancy.  · Hunger.  · Stress.  · Not getting enough sleep or getting too much sleep.  · Weather changes.  · Tiredness (fatigue).  What increases the risk?  · Being 25-55 years old.  · Being  female.  · Having a family history of migraine headaches.  · Being .  · Having depression or anxiety.  · Being very overweight.  What are the signs or symptoms?  · A throbbing pain. This pain may:  ? Happen in any area of the head, such as on one side or both sides.  ? Make it hard to do daily activities.  ? Get worse with physical activity.  ? Get worse around bright lights or loud noises.  · Other symptoms may include:  ? Feeling sick to your stomach (nauseous).  ? Vomiting.  ? Dizziness.  ? Being sensitive to bright lights, loud noises, or smells.  · Before you get a migraine headache, you may get warning signs (an aura). An aura may include:  ? Seeing flashing lights or having blind spots.  ? Seeing bright spots, halos, or zigzag lines.  ? Having tunnel vision or blurred vision.  ? Having numbness or a tingling feeling.  ? Having trouble talking.  ? Having weak muscles.  · Some people have symptoms after a migraine headache (postdromal phase), such as:  ? Tiredness.  ? Trouble thinking (concentrating).  How is this treated?  · Taking medicines that:  ? Relieve pain.  ? Relieve the feeling of being sick to your stomach.  ? Prevent migraine headaches.  · Treatment may also include:  ? Having acupuncture.  ? Avoiding foods that bring on migraine headaches.  ? Learning ways to control your body functions (biofeedback).  ? Therapy to help you know and deal with negative thoughts (cognitive behavioral therapy).  Follow these instructions at home:  Medicines  · Take over-the-counter and prescription medicines only as told by your doctor.  · Ask your doctor if the medicine prescribed to you:  ? Requires you to avoid driving or using heavy machinery.  ? Can cause trouble pooping (constipation). You may need to take these steps to prevent or treat trouble pooping:  § Drink enough fluid to keep your pee (urine) pale yellow.  § Take over-the-counter or prescription medicines.  § Eat foods that are high in fiber.  These include beans, whole grains, and fresh fruits and vegetables.  § Limit foods that are high in fat and sugar. These include fried or sweet foods.  Lifestyle  · Do not drink alcohol.  · Do not use any products that contain nicotine or tobacco, such as cigarettes, e-cigarettes, and chewing tobacco. If you need help quitting, ask your doctor.  · Get at least 8 hours of sleep every night.  · Limit and deal with stress.  General instructions         · Keep a journal to find out what may bring on your migraine headaches. For example, write down:  ? What you eat and drink.  ? How much sleep you get.  ? Any change in what you eat or drink.  ? Any change in your medicines.  · If you have a migraine headache:  ? Avoid things that make your symptoms worse, such as bright lights.  ? It may help to lie down in a dark, quiet room.  ? Do not drive or use heavy machinery.  ? Ask your doctor what activities are safe for you.  · Keep all follow-up visits as told by your doctor. This is important.  Contact a doctor if:  · You get a migraine headache that is different or worse than others you have had.  · You have more than 15 headache days in one month.  Get help right away if:  · Your migraine headache gets very bad.  · Your migraine headache lasts longer than 72 hours.  · You have a fever.  · You have a stiff neck.  · You have trouble seeing.  · Your muscles feel weak or like you cannot control them.  · You start to lose your balance a lot.  · You start to have trouble walking.  · You pass out (faint).  · You have a seizure.  Summary  · A migraine headache is a very strong throbbing pain on one side or both sides of your head. These headaches can also cause other symptoms.  · This condition may be treated with medicines and changes to your lifestyle.  · Keep a journal to find out what may bring on your migraine headaches.  · Contact a doctor if you get a migraine headache that is different or worse than others you have  had.  · Contact your doctor if you have more than 15 headache days in a month.  This information is not intended to replace advice given to you by your health care provider. Make sure you discuss any questions you have with your health care provider.  Document Released: 09/26/2009 Document Revised: 04/10/2020 Document Reviewed: 01/30/2020  Elsevier Patient Education © 2020 [employer]

## 2020-08-20 NOTE — PROGRESS NOTES
"Subjective:    HPI  Samantha Benavides is a 34 y.o. female who presents with chronic migraines and vision changes.    She is here today because she is complaining of visual changes, states that she has had this problems since she was a child it is like snow on a TV and has gotten worse. Feels like \"snow\" or sparkles are over her visual fields.   This is constant. She is now having some hearing loss on the left.  She has developed poor night vision.    She saw an ophthalmologist in February and exam was normal.     She has to adjust the computer to bigger and bigger icons to compensate.    Her mother has Susac syndrome and she is concerned.    She is here today with her wife Terese.           Age at Onset:  Elementary school  Triggers: None  Alleviating factors:  Sumatriptan helps but it gives her nausea.    Meds tried and result:  none  Hormones:  Oral birth control to regulate periods.  Caffeine use:  1 can of coke per day  OTC medications--frequency:  Aleve  Smoking:  None  Alcohol use:  rarely  Sleep apnea:  None  Family Hx:  Mother had Susac and migraines.   How many days per month:  4 migraines per month with 8/10 pain, total headache days per month 20/30.   Missed days of school/work in past 6 months:   None  Quality: frontal, stabbing and throbbing 8/10 pain, vision becomes more blurry looks like sparkles/snow over her visual fields.  Migraines last for one day usually, frequent headaches can last for days.  N&V:  Nausea only  Photo/phonophobia: She is photophobic on a normal basis, it is worse with migraines, she sometimes has phonophobia.    Aura: None         Review of Systems   Constitutional: Negative for chills and fever.   HENT: Positive for hearing loss and tinnitus. Negative for congestion and sinus pain.         Hearing loss and tinnitus left ear.   Eyes: Positive for blurred vision, double vision and photophobia.   Respiratory: Negative for cough, shortness of breath and wheezing.  " "  Cardiovascular: Negative for chest pain and palpitations.   Gastrointestinal: Positive for constipation and nausea. Negative for diarrhea, heartburn and vomiting.   Genitourinary: Negative for dysuria.   Musculoskeletal: Positive for neck pain. Negative for back pain and myalgias.   Skin: Negative for rash.   Neurological: Positive for dizziness, tingling, sensory change and headaches. Negative for focal weakness, seizures and weakness.   Endo/Heme/Allergies: Bruises/bleeds easily.   Psychiatric/Behavioral: Positive for depression. The patient has insomnia. The patient is not nervous/anxious.        Past Medical History:   Diagnosis Date   • Allergy    • Anemia    • Anxiety    • Blood transfusion without reported diagnosis 2018   • Head ache    • Migraine    • Urinary tract infection      Current Outpatient Medications on File Prior to Visit   Medication Sig Dispense Refill   • SUMAtriptan (IMITREX) 25 MG Tab tablet Take 1 Tab by mouth Once PRN for Migraine for up to 1 dose. Repeat once in 2 hours if needed 10 Tab 3   • ferrous sulfate 325 (65 Fe) MG tablet Take 325 mg by mouth.     • drospirenone-ethinyl estradiol (HAIDER) 3-0.03 MG per tablet Take 1 Tab by mouth every day.       No current facility-administered medications on file prior to visit.           Objective:    I personally reviewed the MRI from 7/2020, it is WNL.      /70   Pulse 89   Temp 36.4 °C (97.6 °F) (Temporal)   Resp 16   Ht 1.6 m (5' 3\")   Wt 106.5 kg (234 lb 12.6 oz)   SpO2 89%   BMI 41.59 kg/m²      Physical Exam      Constitutional:  Alert, no apparent distress,  Psych:   mood and affect WNL  Neuro:  Oriented X 4, speech fluent, naming and memory intact    CN II: Visual fields are full to confrontation. Fundoscopic exam is normal with sharp discs and no vascular changes. Pupils are 3mm and briskly reactive to light.   CN III, IV, VI  EOMs intact, no ptosis  CN V: Facial sensation is intact to pinprick in all 3 divisions " bilaterally. Corneal responses are intact.  CN VII: Face is symmetric with normal eye closure and smile.  CN VII: Hearing is normal to rubbing fingers, states it is a little lighter on the right side, Bone conduction normal bilaterally, air conduction is normal bilaterally; however, she felt it was louder and prolonged on the left.   CN IX, X: Palate elevates symmetrically. Phonation is normal.  CN XI: Head turning and shoulder shrug are intact  CN XII: Tongue is midline with normal movements and no atrophy.              Strength 5/5 BUE/BLE, no drift                 Sensation to PP equal bilaterally                 No limb ataxia with finger to nose and heel to shin                 Ambulates with steady gait.                 Rhomberg negative                Biceps,brachioradialis, tricep, patellar and ankle reflex all 2+     Cardiovascular:    S1S2, no abnormal rhythm auscultated, no peripheral edema  Neck:                     No carotid bruits noted   Pulmonary:            Respirations easy, lungs clear to auscultation all fields.     Skin:                     No obvious rashes.           Assessment/Plan:     1. Chronic nonintractable headache, unspecified headache type    Start topiramate 25mg every night, increase to 50mg after 1 week,     Continue Sumatriptan for rescue.     Start magnesium oxide 400mg by mouth every night, may take extra dose if needed for headache (over the counter), hold for diarrhea         Start Riboflavin (Vitamin B2) 400mg by mouth every night (over the counter),may turn urine bright yellow         Start COQ 10, take 300mg every night. (over the counter)          Attempt to go to bed and get up at the same time every night           Eat meals on regular basis            Stay hydrated.             Aerobic exercise 30 minutes daily             Avoid aged or smoked foods, avoid processed foods, red wine, aged cheese              Keep headache diary, include foods that you may have eaten.              Avoid overusing over the counter medications:  Do not take more than 500mg acetaminophen (tylenol), more than 4 times weekly, more frequent or larger doses are associated with medication overuse headache.      2. Visual changes  Mother has Susac syndrome, MRI is normal, will refer to rheaumatology    Follow up in 3 months

## 2020-09-01 ENCOUNTER — PATIENT MESSAGE (OUTPATIENT)
Dept: NEUROLOGY | Facility: MEDICAL CENTER | Age: 35
End: 2020-09-01

## 2020-09-05 ENCOUNTER — OFFICE VISIT (OUTPATIENT)
Dept: URGENT CARE | Facility: CLINIC | Age: 35
End: 2020-09-05
Payer: COMMERCIAL

## 2020-09-05 VITALS
DIASTOLIC BLOOD PRESSURE: 70 MMHG | WEIGHT: 234 LBS | HEIGHT: 63 IN | SYSTOLIC BLOOD PRESSURE: 128 MMHG | RESPIRATION RATE: 17 BRPM | TEMPERATURE: 96.9 F | BODY MASS INDEX: 41.46 KG/M2 | OXYGEN SATURATION: 99 % | HEART RATE: 78 BPM

## 2020-09-05 DIAGNOSIS — J01.10 ACUTE NON-RECURRENT FRONTAL SINUSITIS: ICD-10-CM

## 2020-09-05 DIAGNOSIS — H66.002 NON-RECURRENT ACUTE SUPPURATIVE OTITIS MEDIA OF LEFT EAR WITHOUT SPONTANEOUS RUPTURE OF TYMPANIC MEMBRANE: ICD-10-CM

## 2020-09-05 PROCEDURE — 99214 OFFICE O/P EST MOD 30 MIN: CPT | Performed by: PHYSICIAN ASSISTANT

## 2020-09-05 RX ORDER — AMOXICILLIN AND CLAVULANATE POTASSIUM 875; 125 MG/1; MG/1
1 TABLET, FILM COATED ORAL 2 TIMES DAILY
Qty: 14 TAB | Refills: 0 | Status: SHIPPED | OUTPATIENT
Start: 2020-09-05 | End: 2020-09-12

## 2020-09-05 RX ORDER — MAGNESIUM OXIDE 400 MG/1
400 TABLET ORAL DAILY
COMMUNITY
End: 2021-05-11

## 2020-09-05 RX ORDER — THIAMINE HCL 50 MG
50 TABLET ORAL DAILY
COMMUNITY
End: 2021-05-11

## 2020-09-05 ASSESSMENT — ENCOUNTER SYMPTOMS
SORE THROAT: 1
SHORTNESS OF BREATH: 0
COUGH: 0
SINUS PRESSURE: 1
WHEEZING: 0
HEADACHES: 1

## 2020-09-05 ASSESSMENT — FIBROSIS 4 INDEX: FIB4 SCORE: 0.54

## 2020-09-06 NOTE — PROGRESS NOTES
Subjective:   Samantha Benavides is a 34 y.o. female who presents today with   Chief Complaint   Patient presents with   • Sinus Problem   • Ear Pain       Sinus Problem  This is a new problem. The current episode started in the past 7 days. The problem has been gradually worsening since onset. There has been no fever. The pain is moderate. Associated symptoms include congestion, ear pain, headaches, sinus pressure and a sore throat. Pertinent negatives include no coughing or shortness of breath. Past treatments include nothing. The treatment provided no relief.     Patient states she is felt like the smoke has caused her to have sinus issues and ear pain along with congestion.  PMH:  has a past medical history of Allergy, Anemia, Anxiety, Blood transfusion without reported diagnosis (2018), Head ache, Migraine, and Urinary tract infection.  MEDS:   Current Outpatient Medications:   •  thiamine (THIAMINE) 50 MG Tab, Take 50 mg by mouth every day., Disp: , Rfl:   •  M.T.E. -5 Adult -500-60 MCG/ML injection, by Intravenous route., Disp: , Rfl:   •  magnesium oxide (MAG-OX) 400 MG Tab tablet, Take 400 mg by mouth every day., Disp: , Rfl:   •  amoxicillin-clavulanate (AUGMENTIN) 875-125 MG Tab, Take 1 Tab by mouth 2 times a day for 7 days., Disp: 14 Tab, Rfl: 0  •  SUMAtriptan (IMITREX) 25 MG Tab tablet, Take 1 Tab by mouth Once PRN for Migraine for up to 1 dose. Repeat once in 2 hours if needed, Disp: 10 Tab, Rfl: 3  •  drospirenone-ethinyl estradiol (HAIDER) 3-0.03 MG per tablet, Take 1 Tab by mouth every day., Disp: , Rfl:   •  ferrous sulfate 325 (65 Fe) MG tablet, Take 325 mg by mouth., Disp: , Rfl:   ALLERGIES:   Allergies   Allergen Reactions   • Strawberry Anaphylaxis and Hives     Anaphylaxis      SURGHX: No past surgical history on file.  SOCHX:  reports that she has never smoked. She has never used smokeless tobacco. She reports previous alcohol use. She reports that she does not use drugs.  FH:  "Reviewed with patient, not pertinent to this visit.       Review of Systems   HENT: Positive for congestion, ear pain, sinus pressure and sore throat.    Respiratory: Negative for cough, shortness of breath and wheezing.    Neurological: Positive for headaches.   All other systems reviewed and are negative.       Objective:   /70   Pulse 78   Temp 36.1 °C (96.9 °F) (Temporal)   Resp 17   Ht 1.6 m (5' 3\")   Wt 106.1 kg (234 lb)   SpO2 99%   BMI 41.45 kg/m²   Physical Exam  Vitals signs and nursing note reviewed.   Constitutional:       General: She is not in acute distress.     Appearance: Normal appearance. She is well-developed. She is not ill-appearing or toxic-appearing.   HENT:      Head: Normocephalic and atraumatic.      Right Ear: Hearing normal.      Left Ear: Hearing normal. A middle ear effusion is present. Tympanic membrane is erythematous and bulging.      Nose: Congestion present.      Left Sinus: Frontal sinus tenderness present.   Eyes:      Pupils: Pupils are equal, round, and reactive to light.   Cardiovascular:      Rate and Rhythm: Normal rate and regular rhythm.      Heart sounds: Normal heart sounds.   Pulmonary:      Effort: Pulmonary effort is normal.      Breath sounds: Normal breath sounds.   Musculoskeletal:      Comments: Normal movement in all 4 extremities   Skin:     General: Skin is warm and dry.   Neurological:      Mental Status: She is alert.      Coordination: Coordination normal.   Psychiatric:         Mood and Affect: Mood normal.       Assessment/Plan:   Assessment    1. Acute non-recurrent frontal sinusitis  - amoxicillin-clavulanate (AUGMENTIN) 875-125 MG Tab; Take 1 Tab by mouth 2 times a day for 7 days.  Dispense: 14 Tab; Refill: 0    2. Non-recurrent acute suppurative otitis media of left ear without spontaneous rupture of tympanic membrane  - amoxicillin-clavulanate (AUGMENTIN) 875-125 MG Tab; Take 1 Tab by mouth 2 times a day for 7 days.  Dispense: 14 Tab; " Refill: 0    Other orders  - thiamine (THIAMINE) 50 MG Tab; Take 50 mg by mouth every day.  - M.T.E. -5 Adult -500-60 MCG/ML injection; by Intravenous route.  - magnesium oxide (MAG-OX) 400 MG Tab tablet; Take 400 mg by mouth every day.  Encouraged use of over-the-counter decongestant along with Flonase nasal spray and Claritin daily for the next 7 to 10 days.  Differential diagnosis, natural history, supportive care, and indications for immediate follow-up discussed.   Patient given instructions and understanding of medications and treatment.    If not improving in 3-5 days, F/U with PCP or return to  if symptoms worsen.    Patient agreeable to plan.      Please note that this dictation was created using voice recognition software. I have made every reasonable attempt to correct obvious errors, but I expect that there are errors of grammar and possibly content that I did not discover before finalizing the note.    Perez Key PA-C

## 2020-09-24 RX ORDER — PROPRANOLOL HCL 60 MG
60 CAPSULE, EXTENDED RELEASE 24HR ORAL DAILY
Qty: 90 CAP | Refills: 3 | Status: SHIPPED | OUTPATIENT
Start: 2020-09-24 | End: 2020-09-24 | Stop reason: SDUPTHER

## 2020-09-24 RX ORDER — PROPRANOLOL HCL 60 MG
60 CAPSULE, EXTENDED RELEASE 24HR ORAL DAILY
Qty: 90 CAP | Refills: 3 | Status: SHIPPED | OUTPATIENT
Start: 2020-09-24 | End: 2020-11-19

## 2020-09-25 ENCOUNTER — TELEPHONE (OUTPATIENT)
Dept: MEDICAL GROUP | Facility: LAB | Age: 35
End: 2020-09-25

## 2020-09-30 ENCOUNTER — OFFICE VISIT (OUTPATIENT)
Dept: MEDICAL GROUP | Facility: LAB | Age: 35
End: 2020-09-30
Payer: COMMERCIAL

## 2020-09-30 VITALS
DIASTOLIC BLOOD PRESSURE: 68 MMHG | TEMPERATURE: 96.8 F | OXYGEN SATURATION: 97 % | HEIGHT: 63 IN | HEART RATE: 58 BPM | BODY MASS INDEX: 40.93 KG/M2 | WEIGHT: 231 LBS | RESPIRATION RATE: 16 BRPM | SYSTOLIC BLOOD PRESSURE: 118 MMHG

## 2020-09-30 DIAGNOSIS — F41.9 ANXIETY: ICD-10-CM

## 2020-09-30 DIAGNOSIS — Z83.2 FAMILY HISTORY OF AUTOIMMUNE DISORDER: ICD-10-CM

## 2020-09-30 DIAGNOSIS — H53.9 VISION CHANGES: ICD-10-CM

## 2020-09-30 DIAGNOSIS — H91.92 HEARING LOSS OF LEFT EAR, UNSPECIFIED HEARING LOSS TYPE: ICD-10-CM

## 2020-09-30 DIAGNOSIS — G43.109 MIGRAINE WITH AURA AND WITHOUT STATUS MIGRAINOSUS, NOT INTRACTABLE: ICD-10-CM

## 2020-09-30 PROCEDURE — 99215 OFFICE O/P EST HI 40 MIN: CPT | Performed by: NURSE PRACTITIONER

## 2020-09-30 RX ORDER — TRIAMCINOLONE ACETONIDE 1 MG/G
CREAM TOPICAL
Qty: 45 G | Refills: 0 | Status: SHIPPED | OUTPATIENT
Start: 2020-09-30 | End: 2021-05-11

## 2020-09-30 RX ORDER — LORAZEPAM 1 MG/1
1 TABLET ORAL EVERY 6 HOURS PRN
Qty: 3 TAB | Refills: 0 | Status: SHIPPED | OUTPATIENT
Start: 2020-09-30 | End: 2020-11-16 | Stop reason: SDUPTHER

## 2020-09-30 ASSESSMENT — FIBROSIS 4 INDEX: FIB4 SCORE: 0.54

## 2020-09-30 NOTE — PROGRESS NOTES
"Chief Complaint   Patient presents with   • Eye Problem   • Hearing Problem     X 1 month        HPI   Samantha is a 34-year-old established female here with a couple of issues to follow-up on that are persistently bothering her and she has become frustrated with.  She is also still extremely concerned that she has Susac syndrome b/c of her mom's hx. Since our last visit she has seen neurology but not rheumatology.    #1-Vision difficulties: States that she has had an overlay of snow appearance and difficulty focusing on \"one thing,\" for as long as she can remember, even back to high school. Struggles with depth perception in dim rooms.  Using her vision is painful 24/7 - has to use sunglasses or she has burning / pain in eyes with any type of light.  All vision issues worsened in the past 5 years.  Has seen a few eye docs - given glasses for minor issues but otherwise is told that vision is fine.    #2- hearing difficulties:  New onset x one month.  Sounds \"feel softer and significantly dulled on the left side.\"   Denies tinnitus or ear pain.    #3-migraines:  Chronic issue.  Migraines occur weekly and last 3-4 days.  Only has 2-3 migraine free days per week.  Pt was rx pt imitrex 7/2020 - helps but causes nausea.  topamax caused painful paresthesias and was d/c.  Has tried mg+, coq10, b2 without improvement.  Newly on propranolol 60 mg at night via email through neurology 9/24.  Neurology referred to rheumatology but referral was declined.  Negative dsdna, c4,c3, smith antibodies, complement blood work.  CRP / sed rate positive 6/2020     She does have low back pain.  + \"neuropathy in feet,\" x 3 months.  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  Use to swim - stopped b/c of covid.    Occasionally getting discolored area to AC joints of elbow - itches and present x a few months.   Menses:  Regular now with livia, struggles when she does not get Sydea.  Bleeds with Kat.    Not sick frequently. " "    Mom hsa a dx of susac - patient & mom share similar vision  / hearing issues. Mom lives in [place] but was dx 10 yrs ago.      Past medical, surgical, family, and social history is reviewed and updated in Epic chart by me today.   Medications and allergies reviewed and updated in Epic chart by me today.     ROS:   As documented in history of present illness above    Exam:  /68   Pulse (!) 58   Temp 36 °C (96.8 °F)   Resp 16   Ht 1.6 m (5' 3\")   Wt 104.8 kg (231 lb)   SpO2 97%   Constitutional: Alert, no distress, plus 3 vital signs  Skin:  Warm, dry, no rashes invisible areas  Eye: Equal, round and reactive, conjunctiva clear  ENMT: Lips without lesions, good dentition, oropharynx clear    Neck: Trachea midline, no masses, no thyromegaly  Respiratory: Unlabored respiration, lungs clear to auscultation, no wheezes, no rhonchi  Cardiovascular: Normal rate and rhythm  Psych: Alert, pleasant, well-groomed, normal affect  Neurologic: Alert and oriented. Cranial nerves II-XII intact, EOMs intact, no tongue deviation, PERRL, no facial asymmetry to motor or sensation, symmetric palate, normal finger-to-nose test, no pronator drift. No focal motor deficits. Symmetric reflexes. Normal station and gait, normal tandem walk. Coordination normal    Assessment / Plan / Medical Decision makin. Hearing loss of left ear, unspecified hearing loss type  IGG SERUM QUANT    IGM SERUM QUANT    IGA SERUM QUANT    SERUM PROTEIN ELECTROPHORESIS    MR-MRA NECK-W/O    REFERRAL TO AUDIOLOGY   2. Vision changes  IGG SERUM QUANT    IGM SERUM QUANT    IGA SERUM QUANT    SERUM PROTEIN ELECTROPHORESIS    MR-MRA NECK-W/O    AMB REFERRAL TO NEURO OPHTHALMOLOGY   3. Family history of autoimmune disorder  IGG SERUM QUANT    IGM SERUM QUANT    IGA SERUM QUANT    SERUM PROTEIN ELECTROPHORESIS    MR-MRA NECK-W/O    AMB REFERRAL TO NEURO OPHTHALMOLOGY   4. Anxiety  LORazepam (ATIVAN) 1 MG Tab   Recommend further work-up with " immunoglobulins, serum protein electrophoresis and an MRI of her neck.  She was referred for audiology and neuro-ophthalmology work-up.  She will continue to use Imitrex for abortive therapy but we discussed taking this with food.  She will also continue on propanolol for migraine prevention.  She is not sleeping well, we discussed trial of melatonin with valerian root for sleep.  Also encouraged her to eat nutritionally, exercise and work on some weight loss.  Reviewed last note from neurology as well as previous lab work.  She is frustrated that she was refused by rheumatology.  The patient is considering an outside consult through [facility] rheumatology.  Discussed cortisone 10 for arm rashes.  She was given #3 Lorazepam to take 1 or 2 prior to MRI and one prior to blood draw as she gets extremely anxious with needles and imaging.  Reviewed  profile showing last lorazepam refill was by myself and for #3 pills a few mo ago.     Total face to face time 40 minutes of which over 50% of this visit is spent in counseling, education and outlining a plan of treatment and coordination of care for the above conditions. This included but was not limited to discussion of medication options and potential risks related to the medications, referral and specialty care options. All patient questions were answered

## 2020-10-31 ENCOUNTER — HOSPITAL ENCOUNTER (OUTPATIENT)
Dept: LAB | Facility: MEDICAL CENTER | Age: 35
End: 2020-10-31
Attending: NURSE PRACTITIONER
Payer: COMMERCIAL

## 2020-10-31 DIAGNOSIS — H91.92 HEARING LOSS OF LEFT EAR, UNSPECIFIED HEARING LOSS TYPE: ICD-10-CM

## 2020-10-31 DIAGNOSIS — H53.9 VISION CHANGES: ICD-10-CM

## 2020-10-31 DIAGNOSIS — Z83.2 FAMILY HISTORY OF AUTOIMMUNE DISORDER: ICD-10-CM

## 2020-10-31 PROCEDURE — 84155 ASSAY OF PROTEIN SERUM: CPT

## 2020-10-31 PROCEDURE — 82784 ASSAY IGA/IGD/IGG/IGM EACH: CPT

## 2020-10-31 PROCEDURE — 36415 COLL VENOUS BLD VENIPUNCTURE: CPT

## 2020-10-31 PROCEDURE — 84165 PROTEIN E-PHORESIS SERUM: CPT

## 2020-11-02 LAB
IGA SERPL-MCNC: 208 MG/DL (ref 68–408)
IGG SERPL-MCNC: 1290 MG/DL (ref 768–1632)
IGM SERPL-MCNC: 51 MG/DL (ref 35–263)

## 2020-11-04 DIAGNOSIS — H54.7 VISION LOSS: ICD-10-CM

## 2020-11-04 DIAGNOSIS — H91.90 HEARING LOSS, UNSPECIFIED HEARING LOSS TYPE, UNSPECIFIED LATERALITY: ICD-10-CM

## 2020-11-04 LAB
ALBUMIN SERPL ELPH-MCNC: 3.38 G/DL (ref 3.75–5.01)
ALPHA1 GLOB SERPL ELPH-MCNC: 0.42 G/DL (ref 0.19–0.46)
ALPHA2 GLOB SERPL ELPH-MCNC: 0.92 G/DL (ref 0.48–1.05)
B-GLOBULIN SERPL ELPH-MCNC: 0.87 G/DL (ref 0.48–1.1)
EER PROT ELECT SER Q1092: ABNORMAL
GAMMA GLOB SERPL ELPH-MCNC: 1.11 G/DL (ref 0.62–1.51)
INTERPRETATION SERPL IFE-IMP: ABNORMAL
PROT SERPL-MCNC: 6.7 G/DL (ref 6.3–8.2)

## 2020-11-14 ENCOUNTER — HOSPITAL ENCOUNTER (OUTPATIENT)
Dept: RADIOLOGY | Facility: MEDICAL CENTER | Age: 35
End: 2020-11-14
Attending: NURSE PRACTITIONER
Payer: COMMERCIAL

## 2020-11-14 ENCOUNTER — PATIENT MESSAGE (OUTPATIENT)
Dept: MEDICAL GROUP | Facility: LAB | Age: 35
End: 2020-11-14

## 2020-11-14 DIAGNOSIS — H54.7 VISION LOSS: ICD-10-CM

## 2020-11-14 DIAGNOSIS — H53.9 VISION CHANGES: ICD-10-CM

## 2020-11-14 DIAGNOSIS — H91.90 HEARING LOSS, UNSPECIFIED HEARING LOSS TYPE, UNSPECIFIED LATERALITY: ICD-10-CM

## 2020-11-14 DIAGNOSIS — G43.109 MIGRAINE WITH AURA AND WITHOUT STATUS MIGRAINOSUS, NOT INTRACTABLE: ICD-10-CM

## 2020-11-14 DIAGNOSIS — H91.92 HEARING LOSS OF LEFT EAR, UNSPECIFIED HEARING LOSS TYPE: ICD-10-CM

## 2020-11-16 DIAGNOSIS — F41.9 ANXIETY: ICD-10-CM

## 2020-11-16 RX ORDER — LORAZEPAM 1 MG/1
TABLET ORAL
Qty: 2 TAB | Refills: 0 | Status: SHIPPED | OUTPATIENT
Start: 2020-11-16 | End: 2020-12-29 | Stop reason: SDUPTHER

## 2020-11-16 RX ORDER — LORAZEPAM 1 MG/1
1 TABLET ORAL EVERY 6 HOURS PRN
Qty: 3 TAB | Refills: 0 | Status: CANCELLED | OUTPATIENT
Start: 2020-11-16 | End: 2020-11-19

## 2020-11-16 NOTE — TELEPHONE ENCOUNTER
----- Message from Samantha Benavides sent at 11/14/2020  3:31 PM PST -----  Regarding: Procedure Question  Contact: [phone]  Rosie,    We tried to get my MRI done today of my brain with contrast. However, after I'd taken my lorazepam we were informed that it was an MRA of the neck with and without contrast. I'm very confused at what was ordered and what we are supposed to be getting done at this point. I will be seeing a neuro-ophthalmologist in January. So I would prefer to get this done soon.    Terese would prefer if you would call her and explain so we are all on the same page too. [phone]    Also, I will need another Lorazepam prescription to get me through the needle for contrast.     Sincerely,    Samantha Benavides

## 2020-11-19 ENCOUNTER — OFFICE VISIT (OUTPATIENT)
Dept: NEUROLOGY | Facility: MEDICAL CENTER | Age: 35
End: 2020-11-19
Payer: COMMERCIAL

## 2020-11-19 VITALS
OXYGEN SATURATION: 98 % | SYSTOLIC BLOOD PRESSURE: 126 MMHG | BODY MASS INDEX: 43.61 KG/M2 | HEART RATE: 96 BPM | HEIGHT: 62 IN | TEMPERATURE: 98 F | WEIGHT: 237 LBS | DIASTOLIC BLOOD PRESSURE: 84 MMHG

## 2020-11-19 DIAGNOSIS — G89.29 CHRONIC NONINTRACTABLE HEADACHE, UNSPECIFIED HEADACHE TYPE: ICD-10-CM

## 2020-11-19 DIAGNOSIS — H53.9 VISUAL CHANGES: ICD-10-CM

## 2020-11-19 DIAGNOSIS — R51.9 CHRONIC NONINTRACTABLE HEADACHE, UNSPECIFIED HEADACHE TYPE: ICD-10-CM

## 2020-11-19 PROCEDURE — 99214 OFFICE O/P EST MOD 30 MIN: CPT | Performed by: NURSE PRACTITIONER

## 2020-11-19 RX ORDER — ZONISAMIDE 50 MG/1
50 CAPSULE ORAL
Qty: 90 CAP | Refills: 3 | Status: SHIPPED | OUTPATIENT
Start: 2020-11-19 | End: 2021-01-12 | Stop reason: SINTOL

## 2020-11-19 ASSESSMENT — FIBROSIS 4 INDEX: FIB4 SCORE: 0.54

## 2020-11-19 NOTE — PROGRESS NOTES
"Subjective:    HPI  Samantha Benavides is a 34 y.o. female who presents with chronic migraines and vision changes.     She is here today because she is complaining of visual changes, states that she has had this problems since she was a child it is like snow on a TV and has gotten worse. Feels like \"snow\" or sparkles are over her visual fields.   This is constant. She is now having some hearing loss on the left.  She has developed poor night vision.     She saw an ophthalmologist in February and exam was normal.      She has to adjust the computer to bigger and bigger icons to compensate.     Her mother has Susac syndrome and she is concerned.     She is here today with her wife Terese.      She is going to see a neuro-ophthalmologist, her primary ordered a repeat MRI with contrast prior to visit.  The request is pending.  She has an appointment with Dr. Raines on 1/27/2021.      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 We switched her to propranolol which caused her insomnia. Her total headache days and her migraine days have both increased.  Her vision difficulties are unchanged.      Age at Onset:  Elementary school  Triggers: None  Alleviating factors:  Sumatriptan helps but it gives her nausea.    Meds tried and result: topamax caused tingling, propranolol caused insomnia.  Supplements didn't  Help.   Hormones:  Oral birth control to regulate periods.  Caffeine use:  1 can of coke per day  OTC medications--frequency:  Aleve  Smoking:  None  Alcohol use:  rarely  Sleep apnea:  None  Family Hx:  Mother had Susac and migraines.   How many days per month:  6-8 migraines per month with 8/10 pain, total headache days per month  30/30   Missed days of school/work in past 6 months:   None  Quality: frontal, stabbing and throbbing 8/10 pain, vision becomes more blurry looks like sparkles/snow over her visual fields.  Migraines " "last for one day usually, frequent headaches can last for days.  N&V:  Nausea only  Photo/phonophobia: She is photophobic on a normal basis, it is worse with migraines, she sometimes has phonophobia.    Aura: None           Review of Systems   Constitutional: Negative for chills and fever.   HENT: Positive for hearing loss and tinnitus. Negative for congestion and sinus pain.         Hearing loss and tinnitus left ear.   Eyes: Positive for blurred vision, double vision and photophobia.   Respiratory: Negative for cough, shortness of breath and wheezing.    Cardiovascular: Negative for chest pain and palpitations.   Gastrointestinal: Positive for constipation and nausea. Negative for diarrhea, heartburn and vomiting.   Genitourinary: Negative for dysuria.   Musculoskeletal: Positive for neck pain. Negative for back pain and myalgias.   Skin: Negative for rash.   Neurological: Positive for dizziness, tingling, sensory change and headaches. Negative for focal weakness, seizures and weakness.   Endo/Heme/Allergies: Bruises/bleeds easily.   Psychiatric/Behavioral: Positive for depression. The patient has insomnia. The patient is not nervous/anxious.               Objective:     /84 (BP Location: Left arm)   Pulse 96   Temp 36.7 °C (98 °F) (Temporal)   Ht 1.575 m (5' 2\")   Wt 107.5 kg (237 lb)   SpO2 98%   BMI 43.35 kg/m²      Physical Exam      Constitutional:  Alert, no apparent distress,  Psych:   mood and affect WNL  Neuro:  Oriented X 4, speech fluent, naming and memory intact  Muskuloskeletal:  Moves all extremities equally, strength 5/5 bilaterally, no drift  CN II: Visual fields are full to confrontation. Fundoscopic exam is normal with sharp discs and no vascular changes. Pupils are3 mm and briskly reactive to light.   CN III, IV, VI  EOMs intact, no ptosis.  Vertical nystagmus noted.   CN V: Facial sensation is intact to pinprick in all 3 divisions bilaterally. Corneal responses are intact.  CN VII: " Face is symmetric with normal eye closure and smile.  CN VII: Hearing is normal to rubbing fingers  CN IX, X: Palate elevates symmetrically. Phonation is normal.  CN XI: Head turning and shoulder shrug are intact  CN XII: Tongue is midline with normal movements and no atrophy.                           Sensation to PP equal bilaterally                 No limb ataxia with finger to nose and heel to shin                 Ambulates with steady gait.                 Rhomberg negative              Cardiovascular:    S1S2, no abnormal rhythm auscultated, no peripheral edema  Neck:                     No carotid bruits noted   Pulmonary:            Respirations easy, lungs clear to auscultation all fields.     Skin:                     No obvious rashes.           Assessment/Plan:       1. Chronic nonintractable headache, unspecified headache type     Would avoid anti-depressants , she had  Positive screening for bipolar disorder in the past and reports that she had suicidal thoughts in her teens and early 20s.    Start zonisamide 50mg every night, discussed side effects.      Continue Sumatriptan for rescue.               Attempt to go to bed and get up at the same time every night           Eat meals on regular basis            Stay hydrated.             Aerobic exercise 30 minutes daily             Avoid aged or smoked foods, avoid processed foods, red wine, aged cheese              Keep headache diary, include foods that you may have eaten.             Avoid overusing over the counter medications:  Do not take more than 500mg acetaminophen (tylenol), more than 4 times weekly, more frequent or larger doses are associated with medication overuse headache.        2. Visual changes  Mother has Susac syndrome, MRI is normal,she is going to follow up with Dr. Raines.      Follow up in 6 months, sooner if needed.     I counseled patient on migraine triggers, lifestyle changes, medication overuse, supplements and  medication side effects.  .

## 2020-11-20 ENCOUNTER — PATIENT MESSAGE (OUTPATIENT)
Dept: NEUROLOGY | Facility: MEDICAL CENTER | Age: 35
End: 2020-11-20

## 2020-12-19 ENCOUNTER — HOSPITAL ENCOUNTER (OUTPATIENT)
Dept: RADIOLOGY | Facility: MEDICAL CENTER | Age: 35
End: 2020-12-19
Attending: NURSE PRACTITIONER
Payer: COMMERCIAL

## 2020-12-19 DIAGNOSIS — H91.92 HEARING LOSS OF LEFT EAR, UNSPECIFIED HEARING LOSS TYPE: ICD-10-CM

## 2020-12-19 DIAGNOSIS — H54.7 VISION LOSS: ICD-10-CM

## 2020-12-19 DIAGNOSIS — G43.109 MIGRAINE WITH AURA AND WITHOUT STATUS MIGRAINOSUS, NOT INTRACTABLE: ICD-10-CM

## 2020-12-19 DIAGNOSIS — H53.9 VISION CHANGES: ICD-10-CM

## 2020-12-19 PROCEDURE — 700117 HCHG RX CONTRAST REV CODE 255

## 2020-12-19 PROCEDURE — A9576 INJ PROHANCE MULTIPACK: HCPCS

## 2020-12-19 PROCEDURE — 70553 MRI BRAIN STEM W/O & W/DYE: CPT

## 2020-12-19 RX ADMIN — GADOTERIDOL 20 ML: 279.3 INJECTION, SOLUTION INTRAVENOUS at 10:28

## 2020-12-29 ENCOUNTER — PATIENT MESSAGE (OUTPATIENT)
Dept: MEDICAL GROUP | Facility: LAB | Age: 35
End: 2020-12-29

## 2020-12-29 DIAGNOSIS — F41.9 ANXIETY: ICD-10-CM

## 2020-12-29 RX ORDER — LORAZEPAM 1 MG/1
TABLET ORAL
Qty: 2 TAB | Refills: 0 | Status: SHIPPED | OUTPATIENT
Start: 2020-12-29 | End: 2020-12-29

## 2020-12-30 NOTE — TELEPHONE ENCOUNTER
From: Samantha Benavides  To: KAREN LockPCADEN  Sent: 12/29/2020 4:56 PM PST  Subject: Prescription Question    Rosie,    I am scheduled to get the first of the covid vaccine on the 2nd of Jan and need some lorazepam for that. If you could help, it would be great. Sorry about the short notice and this was the only day I could do it.     Sincerely,    Samantha Benavides

## 2020-12-30 NOTE — PATIENT COMMUNICATION
Received request via: Patient    Was the patient seen in the last year in this department? Yes  LOV: 9/2020    Does the patient have an active prescription (recently filled or refills available) for medication(s) requested? No

## 2021-01-11 ENCOUNTER — PATIENT MESSAGE (OUTPATIENT)
Dept: NEUROLOGY | Facility: MEDICAL CENTER | Age: 36
End: 2021-01-11

## 2021-01-11 DIAGNOSIS — R51.9 CHRONIC NONINTRACTABLE HEADACHE, UNSPECIFIED HEADACHE TYPE: ICD-10-CM

## 2021-01-11 DIAGNOSIS — G89.29 CHRONIC NONINTRACTABLE HEADACHE, UNSPECIFIED HEADACHE TYPE: ICD-10-CM

## 2021-01-12 DIAGNOSIS — G43.009 MIGRAINE WITHOUT AURA AND WITHOUT STATUS MIGRAINOSUS, NOT INTRACTABLE: ICD-10-CM

## 2021-01-27 ENCOUNTER — OFFICE VISIT (OUTPATIENT)
Dept: OPHTHALMOLOGY | Facility: MEDICAL CENTER | Age: 36
End: 2021-01-27
Payer: COMMERCIAL

## 2021-01-27 DIAGNOSIS — H53.19 VISUAL SNOW SYNDROME: ICD-10-CM

## 2021-01-27 DIAGNOSIS — H52.223 REGULAR ASTIGMATISM OF BOTH EYES: ICD-10-CM

## 2021-01-27 DIAGNOSIS — H46.9 OPTIC NEUROPATHY: ICD-10-CM

## 2021-01-27 DIAGNOSIS — G43.109 MIGRAINE WITH AURA AND WITHOUT STATUS MIGRAINOSUS, NOT INTRACTABLE: ICD-10-CM

## 2021-01-27 DIAGNOSIS — Q07.9: ICD-10-CM

## 2021-01-27 PROCEDURE — 99204 OFFICE O/P NEW MOD 45 MIN: CPT | Mod: 25 | Performed by: OPHTHALMOLOGY

## 2021-01-27 PROCEDURE — 92250 FUNDUS PHOTOGRAPHY W/I&R: CPT | Performed by: OPHTHALMOLOGY

## 2021-01-27 ASSESSMENT — REFRACTION_MANIFEST
OD_CYLINDER: +0.75
OS_SPHERE: -0.75
OD_SPHERE: -0.25
METHOD_AUTOREFRACTION: 1
OS_CYLINDER: +1.00
OD_AXIS: 089
OS_AXIS: 111

## 2021-01-27 ASSESSMENT — REFRACTION
OD_AXIS: 096
OS_AXIS: 110
OS_CYLINDER: +1.25
OD_CYLINDER: +1.00
OS_SPHERE: -0.75
OD_SPHERE: -0.25

## 2021-01-27 ASSESSMENT — SLIT LAMP EXAM - LIDS
COMMENTS: NORMAL
COMMENTS: NORMAL

## 2021-01-27 ASSESSMENT — VISUAL ACUITY
OS_CC: 20/15
OD_CC: 20/15-2
METHOD: SNELLEN - LINEAR
CORRECTION_TYPE: GLASSES
OS_CC: J1+
OD_CC: J1+

## 2021-01-27 ASSESSMENT — EXTERNAL EXAM - LEFT EYE: OS_EXAM: NORMAL

## 2021-01-27 ASSESSMENT — REFRACTION_WEARINGRX
OS_SPHERE: -1.25
OS_AXIS: 112
OD_SPHERE: -0.75
OS_CYLINDER: +1.25
OD_AXIS: 082
OD_CYLINDER: +0.75
SPECS_TYPE: SVL

## 2021-01-27 ASSESSMENT — TONOMETRY
OS_IOP_MMHG: 23
OD_IOP_MMHG: 22

## 2021-01-27 ASSESSMENT — ENCOUNTER SYMPTOMS
HEADACHES: 1
PHOTOPHOBIA: 1

## 2021-01-27 ASSESSMENT — EXTERNAL EXAM - RIGHT EYE: OD_EXAM: NORMAL

## 2021-01-27 ASSESSMENT — CONF VISUAL FIELD
OD_NORMAL: 1
OS_NORMAL: 1

## 2021-01-27 NOTE — ASSESSMENT & PLAN NOTE
1/27/2021- Visual snow. MRI unremarkable. Discussed that this is recognizing the normal entopic phenomenon. It occurs most frequently in patients with a history of migraine. It is very difficult to eliminate completely, but sometimes becomes less recognizable with migraine treatments. No evidence of Susac syndrome. Also discussed FL-41 lenses

## 2021-01-27 NOTE — ASSESSMENT & PLAN NOTE
1/27/2021 - OCT NFL thickness 93 OD and 81 OS, somewhat anomalous nerves with temporal crescent, tilting and situs inversus.

## 2021-02-01 RX ORDER — VENLAFAXINE HYDROCHLORIDE 37.5 MG/1
37.5 CAPSULE, EXTENDED RELEASE ORAL DAILY
Qty: 90 CAP | Refills: 3 | Status: SHIPPED | OUTPATIENT
Start: 2021-02-01 | End: 2021-05-11

## 2021-03-03 DIAGNOSIS — G43.009 MIGRAINE WITHOUT AURA AND WITHOUT STATUS MIGRAINOSUS, NOT INTRACTABLE: ICD-10-CM

## 2021-03-10 ENCOUNTER — OFFICE VISIT (OUTPATIENT)
Dept: MEDICAL GROUP | Facility: LAB | Age: 36
End: 2021-03-10
Payer: COMMERCIAL

## 2021-03-10 VITALS
DIASTOLIC BLOOD PRESSURE: 62 MMHG | HEART RATE: 82 BPM | HEIGHT: 62 IN | TEMPERATURE: 98.4 F | RESPIRATION RATE: 13 BRPM | WEIGHT: 242 LBS | OXYGEN SATURATION: 99 % | SYSTOLIC BLOOD PRESSURE: 108 MMHG | BODY MASS INDEX: 44.53 KG/M2

## 2021-03-10 DIAGNOSIS — B35.4 TINEA CORPORIS: ICD-10-CM

## 2021-03-10 PROCEDURE — 99214 OFFICE O/P EST MOD 30 MIN: CPT | Performed by: FAMILY MEDICINE

## 2021-03-10 RX ORDER — CLOTRIMAZOLE 1 %
CREAM (GRAM) TOPICAL
Qty: 40 G | Refills: 0 | Status: SHIPPED | OUTPATIENT
Start: 2021-03-10 | End: 2021-05-11

## 2021-03-10 RX ORDER — LORAZEPAM 1 MG/1
TABLET ORAL
COMMUNITY
Start: 2020-12-29 | End: 2022-04-19

## 2021-03-10 ASSESSMENT — ENCOUNTER SYMPTOMS
SHORTNESS OF BREATH: 0
CHILLS: 0
HEADACHES: 0
VOMITING: 0
PALPITATIONS: 0
DIZZINESS: 0
WHEEZING: 0
CONSTIPATION: 0
ABDOMINAL PAIN: 0
DIARRHEA: 0
FEVER: 0
NAUSEA: 0

## 2021-03-10 ASSESSMENT — FIBROSIS 4 INDEX: FIB4 SCORE: 0.56

## 2021-03-11 NOTE — PATIENT INSTRUCTIONS
Clotrimazole skin cream, lotion, or solution  What is this medicine?  CLOTRIMAZOLE (kloe TRIM a zole) is an antifungal medicine. It is used to treat certain kinds of fungal or yeast infections of the skin.  This medicine may be used for other purposes; ask your health care provider or pharmacist if you have questions.  COMMON BRAND NAME(S): Anti-Fungal, Antifungal, Cruex, Desenex, Fungoid, Lotrimin, Lotrimin AF, Lotrimin AF Ringworm  What should I tell my health care provider before I take this medicine?  They need to know if you have any of these conditions:  · an unusual or allergic reaction to clotrimazole, other antifungals or medicines, foods, dyes or preservatives  · pregnant or trying to get pregnant  · breast-feeding  How should I use this medicine?  This medicine is for external use only. Do not take by mouth. Follow the directions on the prescription label. Wash your hands before and after use. If treating a hand or nail infection, wash hands before use only. Apply a thin layer to the affected area and a small amount to the surrounding area. Rub in gently. Do not get this medicine in your eyes. If you do, rinse out with plenty of cool tap water. Use this medicine at regular intervals. Do not use more often than directed. Finish the full course prescribed by your doctor or health care professional even if you think you are better. Do not stop using except on your doctor's advice.  Talk to your pediatrician regarding the use of this medicine in children. While this drug has been used in young children for selected conditions, precautions do apply.  Overdosage: If you think you have taken too much of this medicine contact a poison control center or emergency room at once.  NOTE: This medicine is only for you. Do not share this medicine with others.  What if I miss a dose?  If you miss a dose, use it as soon as you can. If it is almost time for your next dose, use only that dose. Do not use double or extra  doses.  What may interact with this medicine?  · amphotericin b  · topical products that have nystatin  This list may not describe all possible interactions. Give your health care provider a list of all the medicines, herbs, non-prescription drugs, or dietary supplements you use. Also tell them if you smoke, drink alcohol, or use illegal drugs. Some items may interact with your medicine.  What should I watch for while using this medicine?  Tell your doctor or health care professional if your symptoms do not start to improve after 7 days. Do not self-medicate for more than one week.  If you are using this medicine for 'jock itch' be sure to dry the groin completely after bathing. Do not wear underwear that is tight-fitting or made from synthetic fibers like salena or nylon. Wear loose-fitting, cotton underwear.  If you are using this medicine for athlete's foot be sure to dry your feet carefully after bathing, especially between the toes. Do not wear socks made from wool or synthetic materials like salena or nylon. Wear clean cotton socks and change them at least once a day, change them more if your feet sweat a lot. Also, try to wear sandals or shoes that are well-ventilated.  What side effects may I notice from receiving this medicine?  Side effects that usually do not require medical attention (report to your doctor or health care professional if they continue or are bothersome):  · allergic reactions like skin rash, itching or hives, swelling of the face, lips, or tongue  · skin irritation, burning  This list may not describe all possible side effects. Call your doctor for medical advice about side effects. You may report side effects to FDA at 4-589-FDA-1613.  Where should I keep my medicine?  Keep out of the reach of children.  Store at room temperature between 2 to 30 degrees C (36 to 86 degrees F). Do not freeze. Throw away any unused medicine after the expiration date.  NOTE: This sheet is a summary. It may  not cover all possible information. If you have questions about this medicine, talk to your doctor, pharmacist, or health care provider.  © 2020 [employer] (2017-01-18 16:30:18)

## 2021-03-11 NOTE — PROGRESS NOTES
Subjective:   Samantha Benavides is a 35 y.o. female here today for   Chief Complaint   Patient presents with   • Rash     X over 7 months Rash spreading, crease of elbow, back of knees        #Rash:  -Rash  This is a chronic problem. The current episode started more than 1 month ago. The problem is unchanged. The affected locations include the left arm and right arm. The rash is characterized by dryness, pain, redness and itchiness. She was exposed to nothing. Pertinent negatives include no diarrhea, fever, shortness of breath or vomiting. Past treatments include topical steroids, anti-itch cream and moisturizer. The treatment provided mild relief. There is no history of allergies, asthma, eczema or varicella.       Allergies   Allergen Reactions   • Strawberry Anaphylaxis and Hives     Anaphylaxis          Current medicines (including changes today)  Current Outpatient Medications   Medication Sig Dispense Refill   • clotrimazole (LOTRIMIN) 1 % Cream Apply to the affected area twice a day to the affected area 40 g 0   • LORazepam (ATIVAN) 1 MG Tab TAKE ONE TABLET 45 MINUTES BEFORE MRI. MAY REPEAT 2 HOURS LATER IF NEEDED     • Erenumab (AIMOVIG) 70 MG/ML Solution Auto-injector Inject 1 mL under the skin every 4 weeks. 1 mL 12   • venlafaxine XR (EFFEXOR XR) 37.5 MG CAPSULE SR 24 HR Take 1 Cap by mouth every day. 90 Cap 3   • triamcinolone acetonide (KENALOG) 0.1 % Cream Apply to rash on elbow areas twice daily for 2 weeks.  May restart if rash reappears. 45 g 0   • thiamine (THIAMINE) 50 MG Tab Take 50 mg by mouth every day.     • M.T.E. -5 Adult -500-60 MCG/ML injection by Intravenous route.     • magnesium oxide (MAG-OX) 400 MG Tab tablet Take 400 mg by mouth every day.     • SUMAtriptan (IMITREX) 25 MG Tab tablet Take 1 Tab by mouth Once PRN for Migraine for up to 1 dose. Repeat once in 2 hours if needed 10 Tab 3   • ferrous sulfate 325 (65 Fe) MG tablet Take 325 mg by mouth.     • drospirenone-ethinyl  "estradiol (HAIDER) 3-0.03 MG per tablet Take 1 Tab by mouth every day.       No current facility-administered medications for this visit.     She  has a past medical history of Allergy, Anemia, Anxiety, Blood transfusion without reported diagnosis (2018), Head ache, Migraine, and Urinary tract infection.    ROS   Review of Systems   Constitutional: Negative for chills and fever.   Respiratory: Negative for shortness of breath and wheezing.    Cardiovascular: Negative for chest pain and palpitations.   Gastrointestinal: Negative for abdominal pain, constipation, diarrhea, nausea and vomiting.   Skin: Positive for rash.   Neurological: Negative for dizziness and headaches.          Objective:     Physical Exam:  /62   Pulse 82   Temp 36.9 °C (98.4 °F) (Temporal)   Resp 13   Ht 1.575 m (5' 2.01\")   Wt 110 kg (242 lb)   SpO2 99%  Body mass index is 44.25 kg/m².   Constitutional: Alert, no distress.  Respiratory: Unlabored respiratory effort, lungs clear to auscultation, no wheezes, no rhonchi.  Cardiovascular: Normal S1, S2, no murmur, no edema.  Abdomen: Soft, non-tender, no masses, no hepatosplenomegaly.  Psych: Alert and oriented x3, normal affect and mood.    Physical Exam   Skin: Skin is dry and intact. Rash noted. Rash is macular and urticarial. No cyanosis. Nails show no clubbing.             Assessment and Plan:     1. Tinea corporis  -Signs or symptoms consistent with possible tinea corporis.  We will treat with clotrimazole at this time.  If no improvement in the next 2 to 4 weeks will refer to dermatology for further evaluation and treatment.  - clotrimazole (LOTRIMIN) 1 % Cream; Apply to the affected area twice a day to the affected area  Dispense: 40 g; Refill: 0      Followup: Return if symptoms worsen or fail to improve.         PLEASE NOTE: This dictation was created using voice recognition software. I have made every reasonable attempt to correct obvious errors, but I expect that there are " errors of grammar and possibly content that I did not discover before finalizing the note.

## 2021-05-11 ENCOUNTER — TELEMEDICINE (OUTPATIENT)
Dept: NEUROLOGY | Facility: MEDICAL CENTER | Age: 36
End: 2021-05-11
Attending: NURSE PRACTITIONER
Payer: COMMERCIAL

## 2021-05-11 VITALS — BODY MASS INDEX: 44.16 KG/M2 | HEIGHT: 62 IN | WEIGHT: 240 LBS

## 2021-05-11 DIAGNOSIS — G43.009 MIGRAINE WITHOUT AURA AND WITHOUT STATUS MIGRAINOSUS, NOT INTRACTABLE: ICD-10-CM

## 2021-05-11 DIAGNOSIS — R51.9 CHRONIC NONINTRACTABLE HEADACHE, UNSPECIFIED HEADACHE TYPE: ICD-10-CM

## 2021-05-11 DIAGNOSIS — E66.01 MORBID OBESITY WITH BMI OF 40.0-44.9, ADULT (HCC): ICD-10-CM

## 2021-05-11 DIAGNOSIS — G89.29 CHRONIC NONINTRACTABLE HEADACHE, UNSPECIFIED HEADACHE TYPE: ICD-10-CM

## 2021-05-11 PROCEDURE — 99213 OFFICE O/P EST LOW 20 MIN: CPT | Mod: 95,CR | Performed by: NURSE PRACTITIONER

## 2021-05-11 PROCEDURE — 99212 OFFICE O/P EST SF 10 MIN: CPT | Performed by: NURSE PRACTITIONER

## 2021-05-11 RX ORDER — SUMATRIPTAN 100 MG/1
TABLET, FILM COATED ORAL
Qty: 9 TABLET | Refills: 11 | Status: SHIPPED | OUTPATIENT
Start: 2021-05-11

## 2021-05-11 RX ORDER — LETROZOLE 2.5 MG/1
TABLET, FILM COATED ORAL
COMMUNITY
Start: 2021-04-28 | End: 2022-04-19

## 2021-05-11 RX ORDER — MEDROXYPROGESTERONE ACETATE 10 MG/1
TABLET ORAL
COMMUNITY
Start: 2021-04-28 | End: 2022-04-19

## 2021-05-11 ASSESSMENT — PATIENT HEALTH QUESTIONNAIRE - PHQ9
SUM OF ALL RESPONSES TO PHQ QUESTIONS 1-9: 12
5. POOR APPETITE OR OVEREATING: 1 - SEVERAL DAYS
CLINICAL INTERPRETATION OF PHQ2 SCORE: 2

## 2021-05-11 ASSESSMENT — ENCOUNTER SYMPTOMS
BLURRED VISION: 1
NAUSEA: 0
HEADACHES: 1
SENSORY CHANGE: 1
PALPITATIONS: 0
VOMITING: 0
HEARTBURN: 0
FEVER: 0
NERVOUS/ANXIOUS: 0
BACK PAIN: 1
BRUISES/BLEEDS EASILY: 1
DEPRESSION: 1
COUGH: 0

## 2021-05-11 ASSESSMENT — FIBROSIS 4 INDEX: FIB4 SCORE: 0.56

## 2021-05-11 NOTE — PROGRESS NOTES
"Subjective:    Virtual visit conduced using secure and encrypted ZO[employer] teleconferencing technology.     Patient consented to visit.  Identify of patient was verified.  Both patient and provider were in the State of [place] during the time that the visit was conducted.    EVA Benavides is a 35 y.o. female who presents for follow up for chronic migraines and vision changes.  I last saw her on 1/11/2021.      She is here today because she is complaining of visual changes, states that she has had this problems since she was a child it is like snow on a TV and has gotten worse. Feels like \"snow\" or sparkles are over her visual fields.   This is constant. She is now having some hearing loss on the left.  She has developed poor night vision.     She saw an ophthalmologist in February and exam was normal.      She has to adjust the computer to bigger and bigger icons to compensate.     Her mother has Susac syndrome and she is concerned.     She is here today with her wife Terese.      She is going to see a neuro-ophthalmologist, her primary ordered a repeat MRI with contrast prior to visit.  The request is pending.  She has an appointment with Dr. Raines on 1/27/2021.       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 We switched her to propranolol which caused her insomnia. Her total headache days and her migraine days have both increased.  Her vision difficulties are unchanged.      Age at Onset:  Elementary school  Triggers: None  Alleviating factors:  Sumatriptan helps but it gives her nausea.    Meds tried and result: topamax caused tingling, propranolol caused insomnia.  Supplements didn't  Help.  Zonisamide made the migraines worse.  Venlafaxine didn't work. Magnesium caused diarrhea.   Aimovig has helped.   Hormones:  On fertility drugs.   Caffeine use:  1 can of coke per day  OTC " medications--frequency:  Aleve  Smoking:  None  Alcohol use:  rarely  Sleep apnea:  None  Family Hx:  Mother had Susac and migraines.   How many days per month: 4 migraines per month, less intense then before, frequent more mild headaches have went from 30/30 to 15/30.   Missed days of school/work in past 6 months:   None  Quality: frontal, stabbing and throbbing 8/10 pain, vision becomes more blurry looks like sparkles/snow over her visual fields.  Migraines last for one day usually, frequent headaches can last for days.  N&V:  Nausea only  Photo/phonophobia: She is photophobic on a normal basis, it is worse with migraines, she sometimes has phonophobia.    Aura: None    Review of Systems   Constitutional: Negative for fever.   HENT: Negative for hearing loss.    Eyes: Positive for blurred vision.        Visual snow syndrome   Respiratory: Negative for cough.    Cardiovascular: Negative for chest pain and palpitations.   Gastrointestinal: Negative for heartburn, nausea and vomiting.   Genitourinary: Negative.    Musculoskeletal: Positive for back pain.   Skin: Positive for rash.   Neurological: Positive for sensory change and headaches.        Neuropathy in feet    Endo/Heme/Allergies: Bruises/bleeds easily.   Psychiatric/Behavioral: Positive for depression. The patient is not nervous/anxious.      Current Outpatient Medications on File Prior to Visit   Medication Sig Dispense Refill   • metFORMIN (GLUCOPHAGE) 500 MG Tab      • letrozole (FEMARA) 2.5 MG Tab      • medroxyPROGESTERone (PROVERA) 10 MG Tab      • LORazepam (ATIVAN) 1 MG Tab TAKE ONE TABLET 45 MINUTES BEFORE MRI. MAY REPEAT 2 HOURS LATER IF NEEDED     • ferrous sulfate 325 (65 Fe) MG tablet Take 325 mg by mouth.       No current facility-administered medications on file prior to visit.     Past Medical History:   Diagnosis Date   • Allergy    • Anemia    • Anxiety    • Blood transfusion without reported diagnosis 2018   • Head ache    • Migraine    •  "Urinary tract infection             Objective:     Ht 1.575 m (5' 2\")   Wt 109 kg (240 lb)   BMI 43.90 kg/m²      Physical Exam  Constitutional:  Alert, no apparent distress,  Psych:   mood and affect WNL  Neuro:  Oriented X 4, speech fluent, naming and memory intact  Muskuloskeletal:  Moves all extremities equally,   CN VII: Face is symmetric    CN X: Phonation is normal.  CN XI: Head turning and shoulder shrug are intact                   Cardiovascular:  no peripheral edema noted.  Pulmonary:            Respirations easy,   Skin:                     No obvious rashes.   Assessment/Plan:      1. Chronic nonintractable headache, unspecified headache type     Would avoid anti-depressants , she had  Positive screening for bipolar disorder in the past and reports that she had suicidal thoughts in her teens and early 20s.     Continue Sumatriptan for rescue.                Attempt to go to bed and get up at the same time every night           Eat meals on regular basis            Stay hydrated.             Aerobic exercise 30 minutes daily             Avoid aged or smoked foods, avoid processed foods, red wine, aged cheese              Keep headache diary, include foods that you may have eaten.             Avoid overusing over the counter medications:  Do not take more than 500mg acetaminophen (tylenol), more than 4 times weekly, more frequent or larger doses are associated with medication overuse headache.     We discussed her plans to become pregnant, she will follow up with me if the headaches are worse.  She knows she will have to stop the medications.      2. 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     I spentminutes caring for patient, my time includes reviewing the chart, obtaining current HPI, exam, discussion of disease process, ordering testing and medications and counseling.      I counseled patient on migraine triggers, lifestyle changes, medication overuse, supplements " and medication side effects.                 Follow up in 6 months, sooner if needed.

## 2021-05-12 ENCOUNTER — OFFICE VISIT (OUTPATIENT)
Dept: MEDICAL GROUP | Facility: LAB | Age: 36
End: 2021-05-12
Payer: COMMERCIAL

## 2021-05-12 VITALS
SYSTOLIC BLOOD PRESSURE: 116 MMHG | WEIGHT: 250 LBS | HEIGHT: 62 IN | OXYGEN SATURATION: 96 % | TEMPERATURE: 97.7 F | DIASTOLIC BLOOD PRESSURE: 78 MMHG | HEART RATE: 87 BPM | RESPIRATION RATE: 13 BRPM | BODY MASS INDEX: 46.01 KG/M2

## 2021-05-12 DIAGNOSIS — L30.9 ACUTE ECZEMA: ICD-10-CM

## 2021-05-12 PROCEDURE — 99214 OFFICE O/P EST MOD 30 MIN: CPT | Performed by: FAMILY MEDICINE

## 2021-05-12 RX ORDER — CLOBETASOL PROPIONATE 0.5 MG/G
OINTMENT TOPICAL
Qty: 60 G | Refills: 0 | Status: SHIPPED | OUTPATIENT
Start: 2021-05-12 | End: 2022-04-19

## 2021-05-12 ASSESSMENT — FIBROSIS 4 INDEX: FIB4 SCORE: 0.56

## 2021-05-12 ASSESSMENT — ENCOUNTER SYMPTOMS
FEVER: 0
DOUBLE VISION: 0
WHEEZING: 0
COUGH: 0
SHORTNESS OF BREATH: 0
CHILLS: 0
BLURRED VISION: 0

## 2021-05-12 NOTE — PROGRESS NOTES
Subjective:   Samantha Benavides is a 35 y.o. female here today for   Chief Complaint   Patient presents with   • Eczema       #Rash:  -Patient continues to have a improvement rash across the flexural surfaces of her arms bilaterally, right greater than left.  Continues to be slightly painful, pruritic.  She has tried several different lotions, over-the-counter hydrocortisone cream.  We even tried at one point treating for tinea with Chlortrimazole with no apparent success.  She states she is getting new lesions on her arms as well as on her superior eyelids which can become red, pruritic and very painful.  She denies any changes in lotions, creams, detergents, perfumes, deodorants.  Has recently moved out to [facility] although she states the symptoms been going on for over a year.  Her move out to [facility] have the last few months.  She is otherwise asymptomatic.  She cannot correlate the beginning of the symptoms with the beginning of any other medications.  Here today for further evaluation review.      Allergies   Allergen Reactions   • Strawberry Anaphylaxis and Hives     Anaphylaxis          Current medicines (including changes today)  Current Outpatient Medications   Medication Sig Dispense Refill   • clobetasol (TEMOVATE) 0.05 % Ointment Apply to the affected area on arms twice a day for 2 weeks. 60 g 0   • metFORMIN (GLUCOPHAGE) 500 MG Tab      • letrozole (FEMARA) 2.5 MG Tab      • medroxyPROGESTERone (PROVERA) 10 MG Tab      • sumatriptan (IMITREX) 100 MG tablet Take 1 tablet po at onset of headache, may repeat dose in 2 hours if unrelieved.  Do not exceed more than 2 tablets in 24 hours. 9 tablet 11   • Erenumab (AIMOVIG) 70 MG/ML Solution Auto-injector Inject 1 mL under the skin every 4 weeks. 1 mL 12   • LORazepam (ATIVAN) 1 MG Tab TAKE ONE TABLET 45 MINUTES BEFORE MRI. MAY REPEAT 2 HOURS LATER IF NEEDED     • ferrous sulfate 325 (65 Fe) MG tablet Take 325 mg by mouth.       No current facility-administered  "medications for this visit.     She  has a past medical history of Allergy, Anemia, Anxiety, Blood transfusion without reported diagnosis (2018), Head ache, Migraine, and Urinary tract infection.    ROS   Review of Systems   Constitutional: Negative for chills and fever.   Eyes: Negative for blurred vision and double vision.   Respiratory: Negative for cough, shortness of breath and wheezing.    Skin: Positive for itching and rash.      Objective:     Physical Exam:  /78   Pulse 87   Temp 36.5 °C (97.7 °F) (Temporal)   Resp 13   Ht 1.575 m (5' 2.01\")   Wt 113 kg (250 lb)   SpO2 96%  Body mass index is 45.71 kg/m².   Constitutional: Alert, no distress.  Skin: Warm, dry, good turgor, no rashes in visible areas.  Confluent macular rash noted on the flexural surfaces of both arms bilaterally, right greater than left.  Excoriation marks as well as areas of thickened skin were also noted in said area.  Eye: Equal, round and reactive, conjunctiva clear, lids normal.  Respiratory: Unlabored respiratory effort  Cardiovascular: Normal S1, S2, no murmur, no edema.      Assessment and Plan:     1. Acute eczema  -At this time we will try 1 last effort and use ultra potent steroid on arms twice a day, continue emollient creams.  We will also place referral for dermatology for further evaluation and treatment.  - clobetasol (TEMOVATE) 0.05 % Ointment; Apply to the affected area on arms twice a day for 2 weeks.  Dispense: 60 g; Refill: 0  - REFERRAL TO DERMATOLOGY      Followup: Return if symptoms worsen or fail to improve.         PLEASE NOTE: This dictation was created using voice recognition software. I have made every reasonable attempt to correct obvious errors, but I expect that there are errors of grammar and possibly content that I did not discover before finalizing the note.  "

## 2021-08-28 ENCOUNTER — HOSPITAL ENCOUNTER (OUTPATIENT)
Dept: LAB | Facility: MEDICAL CENTER | Age: 36
End: 2021-08-28
Attending: OBSTETRICS & GYNECOLOGY
Payer: COMMERCIAL

## 2021-08-28 LAB — PROGEST SERPL-MCNC: 6.39 NG/ML

## 2021-08-28 PROCEDURE — 84144 ASSAY OF PROGESTERONE: CPT

## 2021-08-28 PROCEDURE — 36415 COLL VENOUS BLD VENIPUNCTURE: CPT

## 2022-03-20 ENCOUNTER — APPOINTMENT (OUTPATIENT)
Dept: RADIOLOGY | Facility: IMAGING CENTER | Age: 37
End: 2022-03-20
Attending: PHYSICIAN ASSISTANT
Payer: COMMERCIAL

## 2022-03-20 ENCOUNTER — OFFICE VISIT (OUTPATIENT)
Dept: URGENT CARE | Facility: PHYSICIAN GROUP | Age: 37
End: 2022-03-20
Payer: COMMERCIAL

## 2022-03-20 VITALS
HEIGHT: 62 IN | HEART RATE: 67 BPM | BODY MASS INDEX: 45.9 KG/M2 | DIASTOLIC BLOOD PRESSURE: 74 MMHG | OXYGEN SATURATION: 97 % | TEMPERATURE: 98.5 F | RESPIRATION RATE: 16 BRPM | WEIGHT: 249.4 LBS | SYSTOLIC BLOOD PRESSURE: 118 MMHG

## 2022-03-20 DIAGNOSIS — S46.911A STRAIN OF RIGHT SHOULDER, INITIAL ENCOUNTER: ICD-10-CM

## 2022-03-20 LAB
INT CON NEG: NORMAL
INT CON POS: NORMAL
POC URINE PREGNANCY TEST: NEGATIVE

## 2022-03-20 PROCEDURE — 73030 X-RAY EXAM OF SHOULDER: CPT | Mod: TC,RT | Performed by: RADIOLOGY

## 2022-03-20 PROCEDURE — 81025 URINE PREGNANCY TEST: CPT | Performed by: PHYSICIAN ASSISTANT

## 2022-03-20 PROCEDURE — 99213 OFFICE O/P EST LOW 20 MIN: CPT | Performed by: PHYSICIAN ASSISTANT

## 2022-03-20 RX ORDER — METHYLPREDNISOLONE 4 MG/1
TABLET ORAL
Qty: 21 TABLET | Refills: 0 | Status: SHIPPED | OUTPATIENT
Start: 2022-03-20

## 2022-03-20 RX ORDER — CYCLOBENZAPRINE HCL 5 MG
5-10 TABLET ORAL EVERY 8 HOURS PRN
Qty: 30 TABLET | Refills: 0 | Status: SHIPPED | OUTPATIENT
Start: 2022-03-20 | End: 2022-04-19

## 2022-03-20 ASSESSMENT — ENCOUNTER SYMPTOMS
CHILLS: 0
TINGLING: 0
SENSORY CHANGE: 0
FEVER: 0

## 2022-03-20 ASSESSMENT — FIBROSIS 4 INDEX: FIB4 SCORE: 0.57

## 2022-03-20 NOTE — PROGRESS NOTES
"Subjective:   Samantha Benavides  is a 36 y.o. female who presents for Shoulder Pain (R shoulder pain, pain radiating down arm, limited ROM, onset today )      Shoulder Pain  This is a new problem. The current episode started today. Pertinent negatives include no chills, fever or rash.   Patient presents urgent care complaining of injury that occurred to her right shoulder today.  She states she was sitting attempting to collect a urine sample for pregnancy testing (which was NEGATIVE) when she reached out in order with external rotation right shoulder she felt of pain sensation from top of right shoulder radiating down over deltoid to triceps and brachioradialis.  She denies numbness or tingling but complains of pain.  Patient is right-hand dominant.  She denies history of surgery or significant injury to right shoulder.  She has tried nothing for the pain thus far.  Upon arriving in the urgent care she dropped an item of clothing and reflexively reached out to catch it when she felt an additional pop and pain to right shoulder.    Review of Systems   Constitutional: Negative for chills and fever.   Musculoskeletal: Positive for joint pain ( right shoulder).   Skin: Negative for rash.   Neurological: Negative for tingling and sensory change.       Allergies   Allergen Reactions   • Strawberry Anaphylaxis and Hives     Anaphylaxis         Objective:   /74 (BP Location: Left arm, Patient Position: Sitting, BP Cuff Size: Adult)   Pulse 67   Temp 36.9 °C (98.5 °F) (Temporal)   Resp 16   Ht 1.575 m (5' 2.01\")   Wt 113 kg (249 lb 6.4 oz)   SpO2 97%   BMI 45.60 kg/m²     Physical Exam  Vitals and nursing note reviewed.   Constitutional:       General: She is not in acute distress.     Appearance: She is well-developed. She is not diaphoretic.   HENT:      Head: Normocephalic and atraumatic.      Right Ear: External ear normal.      Left Ear: External ear normal.      Nose: Nose normal.   Eyes:      General: " Lids are normal. No scleral icterus.        Right eye: No discharge.         Left eye: No discharge.      Conjunctiva/sclera: Conjunctivae normal.   Pulmonary:      Effort: Pulmonary effort is normal. No respiratory distress.   Musculoskeletal:      Right shoulder: Tenderness ( diffuse over deltoid, all upper arm) present. No swelling, deformity, effusion, laceration, bony tenderness or crepitus. Decreased range of motion ( pain w/ overhead). Normal strength ( RC full strength). Normal pulse.      Left shoulder: Normal strength. Normal pulse.      Cervical back: Neck supple.   Skin:     General: Skin is warm and dry.      Coloration: Skin is not pale.      Findings: No erythema.   Neurological:      Mental Status: She is alert and oriented to person, place, and time. She is not disoriented.   Psychiatric:         Speech: Speech normal.         Behavior: Behavior normal.       DX shoulder -   FINDINGS:  Bone mineralization is normal.  There is no evidence of acute fracture.  There is no evidence of dislocation.  There is no evidence of arthropathy.  No abnormalities are identified in the soft tissues.     IMPRESSION:     There is no evidence of acute fracture.             Exam Ended: 03/20/22  1:37 PM Last Resulted: 03/20/22  1:39 PM           POCT HCG - NEG    Assessment/Plan:   1. Strain of right shoulder, initial encounter  - DX-SHOULDER 2+ RIGHT; Future  - POCT Pregnancy  - methylPREDNISolone (MEDROL DOSEPAK) 4 MG Tablet Therapy Pack; Follow schedule on package instructions.  Dispense: 21 Tablet; Refill: 0  - cyclobenzaprine (FLEXERIL) 5 mg tablet; Take 1-2 Tablets by mouth every 8 hours as needed for Moderate Pain or Muscle Spasms.  Dispense: 30 Tablet; Refill: 0  - Referral to Orthopedics  Recommend conservative care, rest, ice, elevation, work on gentle ROM exercises, PROM exercises demonstrated, Rx Medrol, Cautioned regarding potential side effects of steroid, avoid nsaids while using  Rx Flexeril,Cautioned  regarding potential for sedation with medication.  Return to clinic with lack of resolution or progression of symptoms.  Follow-up with orthopedics for persistent symptoms    I have worn an N95 mask, gloves and eye protection for the entire encounter with this patient.     Differential diagnosis, natural history, supportive care, and indications for immediate follow-up discussed.

## 2022-04-15 ENCOUNTER — OFFICE VISIT (OUTPATIENT)
Dept: URGENT CARE | Facility: PHYSICIAN GROUP | Age: 37
End: 2022-04-15
Payer: COMMERCIAL

## 2022-04-15 ENCOUNTER — APPOINTMENT (OUTPATIENT)
Dept: RADIOLOGY | Facility: IMAGING CENTER | Age: 37
End: 2022-04-15
Attending: NURSE PRACTITIONER
Payer: COMMERCIAL

## 2022-04-15 VITALS
BODY MASS INDEX: 45.82 KG/M2 | RESPIRATION RATE: 16 BRPM | TEMPERATURE: 98.1 F | OXYGEN SATURATION: 95 % | DIASTOLIC BLOOD PRESSURE: 74 MMHG | HEIGHT: 62 IN | HEART RATE: 90 BPM | SYSTOLIC BLOOD PRESSURE: 114 MMHG | WEIGHT: 249 LBS

## 2022-04-15 DIAGNOSIS — R06.02 SHORTNESS OF BREATH: ICD-10-CM

## 2022-04-15 DIAGNOSIS — J20.9 BRONCHOSPASM WITH BRONCHITIS, ACUTE: ICD-10-CM

## 2022-04-15 DIAGNOSIS — J01.90 ACUTE BACTERIAL SINUSITIS: ICD-10-CM

## 2022-04-15 DIAGNOSIS — B96.89 ACUTE BACTERIAL SINUSITIS: ICD-10-CM

## 2022-04-15 LAB
INT CON NEG: NEGATIVE
INT CON POS: POSITIVE
POC URINE PREGNANCY TEST: NEGATIVE

## 2022-04-15 PROCEDURE — 99214 OFFICE O/P EST MOD 30 MIN: CPT | Mod: 25 | Performed by: NURSE PRACTITIONER

## 2022-04-15 PROCEDURE — 94640 AIRWAY INHALATION TREATMENT: CPT | Performed by: NURSE PRACTITIONER

## 2022-04-15 PROCEDURE — 81025 URINE PREGNANCY TEST: CPT | Performed by: NURSE PRACTITIONER

## 2022-04-15 PROCEDURE — 71046 X-RAY EXAM CHEST 2 VIEWS: CPT | Mod: TC | Performed by: RADIOLOGY

## 2022-04-15 RX ORDER — ALBUTEROL SULFATE 90 UG/1
2 AEROSOL, METERED RESPIRATORY (INHALATION) EVERY 6 HOURS PRN
Qty: 8.5 G | Refills: 0 | Status: SHIPPED | OUTPATIENT
Start: 2022-04-15

## 2022-04-15 RX ORDER — METHYLPREDNISOLONE 4 MG/1
TABLET ORAL
Qty: 21 TABLET | Refills: 0 | Status: SHIPPED | OUTPATIENT
Start: 2022-04-15

## 2022-04-15 RX ORDER — DOXYCYCLINE HYCLATE 100 MG
100 TABLET ORAL 2 TIMES DAILY
Qty: 14 TABLET | Refills: 0 | Status: SHIPPED | OUTPATIENT
Start: 2022-04-15 | End: 2022-04-22

## 2022-04-15 RX ORDER — ALBUTEROL SULFATE 2.5 MG/3ML
2.5 SOLUTION RESPIRATORY (INHALATION) ONCE
Status: COMPLETED | OUTPATIENT
Start: 2022-04-15 | End: 2022-04-15

## 2022-04-15 RX ADMIN — ALBUTEROL SULFATE 2.5 MG: 2.5 SOLUTION RESPIRATORY (INHALATION) at 19:06

## 2022-04-15 ASSESSMENT — ENCOUNTER SYMPTOMS
SPUTUM PRODUCTION: 1
COUGH: 1
DIARRHEA: 0
SHORTNESS OF BREATH: 1
HEADACHES: 0
WHEEZING: 1
HEMOPTYSIS: 0
SORE THROAT: 0
NAUSEA: 0
VOMITING: 0
SINUS PAIN: 0
FEVER: 0

## 2022-04-15 ASSESSMENT — FIBROSIS 4 INDEX: FIB4 SCORE: 0.57

## 2022-04-16 NOTE — PATIENT INSTRUCTIONS
Symptomatic care.  -Oral hydration and rest.   -Cough control: nonpharmacologic options for cough relief such as throat lozenges, hot tea, honey.  -Over the counter expectorant as directed; Guaifenesin (Mucinex).  -Tylenol or ibuprofen for pain and fever as directed.   -Allergy medications as directed (Zyrtec or Loratadine).  -You may try saline irrigation or neti pot.    -Warm compress to sinuses.     Seek emergency medical care immediately for: Trouble breathing, persistent pain or pressure in the chest, confusion, inability to wake or stay awake, bluish lips or face, persistent tachycardia (fast heart rate), prolonged dizziness, persistent high grade fevers. Follow up for prolonged cough, persistent wheezing, leg swelling, or any other concerns. Follow up with your Primary Care Provider.       Bronchitis  Bronchitis is the body's way of reacting to injury and/or infection (inflammation) of the bronchi. Bronchi are the air tubes that extend from the windpipe into the lungs. If the inflammation becomes severe, it may cause shortness of breath.  CAUSES   Inflammation may be caused by:  · A virus.  · Germs (bacteria).  · Dust.  · Allergens.  · Pollutants and many other irritants.  The cells lining the bronchial tree are covered with tiny hairs (cilia). These constantly beat upward, away from the lungs, toward the mouth. This keeps the lungs free of pollutants. When these cells become too irritated and are unable to do their job, mucus begins to develop. This causes the characteristic cough of bronchitis. The cough clears the lungs when the cilia are unable to do their job. Without either of these protective mechanisms, the mucus would settle in the lungs. Then you would develop pneumonia.  Smoking is a common cause of bronchitis and can contribute to pneumonia. Stopping this habit is the single most important thing you can do to help yourself.  TREATMENT   · Your caregiver may prescribe an antibiotic if the cough is  caused by bacteria. Also, medicines that open up your airways make it easier to breathe. Your caregiver may also recommend or prescribe an expectorant. It will loosen the mucus to be coughed up. Only take over-the-counter or prescription medicines for pain, discomfort, or fever as directed by your caregiver.  · Removing whatever causes the problem (smoking, for example) is critical to preventing the problem from getting worse.  · Cough suppressants may be prescribed for relief of cough symptoms.  · Inhaled medicines may be prescribed to help with symptoms now and to help prevent problems from returning.  · For those with recurrent (chronic) bronchitis, there may be a need for steroid medicines.  SEEK IMMEDIATE MEDICAL CARE IF:   · During treatment, you develop more pus-like mucus (purulent sputum).  · You have a fever.  · Your baby is older than 3 months with a rectal temperature of 102° F (38.9° C) or higher.  · Your baby is 3 months old or younger with a rectal temperature of 100.4° F (38° C) or higher.  · You become progressively more ill.  · You have increased difficulty breathing, wheezing, or shortness of breath.  It is necessary to seek immediate medical care if you are elderly or sick from any other disease.  MAKE SURE YOU:   · Understand these instructions.  · Will watch your condition.  · Will get help right away if you are not doing well or get worse.  Document Released: 12/18/2006 Document Revised: 03/11/2013 Document Reviewed: 10/27/2009  [employer]Care® Patient Information ©2014 [employer].      Sinusitis, Adult  Sinusitis is inflammation of your sinuses. Sinuses are hollow spaces in the bones around your face. Your sinuses are located:  · Around your eyes.  · In the middle of your forehead.  · Behind your nose.  · In your cheekbones.  Mucus normally drains out of your sinuses. When your nasal tissues become inflamed or swollen, mucus can become trapped or blocked. This allows bacteria, viruses, and fungi  to grow, which leads to infection. Most infections of the sinuses are caused by a virus.  Sinusitis can develop quickly. It can last for up to 4 weeks (acute) or for more than 12 weeks (chronic). Sinusitis often develops after a cold.  What are the causes?  This condition is caused by anything that creates swelling in the sinuses or stops mucus from draining. This includes:  · Allergies.  · Asthma.  · Infection from bacteria or viruses.  · Deformities or blockages in your nose or sinuses.  · Abnormal growths in the nose (nasal polyps).  · Pollutants, such as chemicals or irritants in the air.  · Infection from fungi (rare).  What increases the risk?  You are more likely to develop this condition if you:  · Have a weak body defense system (immune system).  · Do a lot of swimming or diving.  · Overuse nasal sprays.  · Smoke.  What are the signs or symptoms?  The main symptoms of this condition are pain and a feeling of pressure around the affected sinuses. Other symptoms include:  · Stuffy nose or congestion.  · Thick drainage from your nose.  · Swelling and warmth over the affected sinuses.  · Headache.  · Upper toothache.  · A cough that may get worse at night.  · Extra mucus that collects in the throat or the back of the nose (postnasal drip).  · Decreased sense of smell and taste.  · Fatigue.  · A fever.  · Sore throat.  · Bad breath.  How is this diagnosed?  This condition is diagnosed based on:  · Your symptoms.  · Your medical history.  · A physical exam.  · Tests to find out if your condition is acute or chronic. This may include:  ? Checking your nose for nasal polyps.  ? Viewing your sinuses using a device that has a light (endoscope).  ? Testing for allergies or bacteria.  ? Imaging tests, such as an MRI or CT scan.  In rare cases, a bone biopsy may be done to rule out more serious types of fungal sinus disease.  How is this treated?  Treatment for sinusitis depends on the cause and whether your condition  is chronic or acute.  · If caused by a virus, your symptoms should go away on their own within 10 days. You may be given medicines to relieve symptoms. They include:  ? Medicines that shrink swollen nasal passages (topical intranasal decongestants).  ? Medicines that treat allergies (antihistamines).  ? A spray that eases inflammation of the nostrils (topical intranasal corticosteroids).  ? Rinses that help get rid of thick mucus in your nose (nasal saline washes).  · If caused by bacteria, your health care provider may recommend waiting to see if your symptoms improve. Most bacterial infections will get better without antibiotic medicine. You may be given antibiotics if you have:  ? A severe infection.  ? A weak immune system.  · If caused by narrow nasal passages or nasal polyps, you may need to have surgery.  Follow these instructions at home:  Medicines  · Take, use, or apply over-the-counter and prescription medicines only as told by your health care provider. These may include nasal sprays.  · If you were prescribed an antibiotic medicine, take it as told by your health care provider. Do not stop taking the antibiotic even if you start to feel better.  Hydrate and humidify    · Drink enough fluid to keep your urine pale yellow. Staying hydrated will help to thin your mucus.  · Use a cool mist humidifier to keep the humidity level in your home above 50%.  · Inhale steam for 10-15 minutes, 3-4 times a day, or as told by your health care provider. You can do this in the bathroom while a hot shower is running.  · Limit your exposure to cool or dry air.  Rest  · Rest as much as possible.  · Sleep with your head raised (elevated).  · Make sure you get enough sleep each night.  General instructions    · Apply a warm, moist washcloth to your face 3-4 times a day or as told by your health care provider. This will help with discomfort.  · Wash your hands often with soap and water to reduce your exposure to germs. If  soap and water are not available, use hand .  · Do not smoke. Avoid being around people who are smoking (secondhand smoke).  · Keep all follow-up visits as told by your health care provider. This is important.  Contact a health care provider if:  · You have a fever.  · Your symptoms get worse.  · Your symptoms do not improve within 10 days.  Get help right away if:  · You have a severe headache.  · You have persistent vomiting.  · You have severe pain or swelling around your face or eyes.  · You have vision problems.  · You develop confusion.  · Your neck is stiff.  · You have trouble breathing.  Summary  · Sinusitis is soreness and inflammation of your sinuses. Sinuses are hollow spaces in the bones around your face.  · This condition is caused by nasal tissues that become inflamed or swollen. The swelling traps or blocks the flow of mucus. This allows bacteria, viruses, and fungi to grow, which leads to infection.  · If you were prescribed an antibiotic medicine, take it as told by your health care provider. Do not stop taking the antibiotic even if you start to feel better.  · Keep all follow-up visits as told by your health care provider. This is important.  This information is not intended to replace advice given to you by your health care provider. Make sure you discuss any questions you have with your health care provider.  Document Released: 12/18/2006 Document Revised: 05/20/2019 Document Reviewed: 05/20/2019  Elsevier Patient Education © 2020 [employer]

## 2022-04-16 NOTE — PROGRESS NOTES
Subjective:     Samantha Benavides is a 36 y.o. female who presents for Cough (Coughing up phlegm and loss of voice x 1 week)      Started as allergies. Post nasal drip. Taking Mucinex DM. No history of COVID. Negtive COVID test.    Cough  This is a new problem. The current episode started in the past 7 days. The problem has been gradually worsening. The cough is productive of sputum. Associated symptoms include ear congestion, ear pain, nasal congestion, postnasal drip, shortness of breath and wheezing. Pertinent negatives include no fever, headaches, hemoptysis or sore throat. Her past medical history is significant for bronchitis, environmental allergies and pneumonia.       Past Medical History:   Diagnosis Date   • Allergy    • Anemia    • Anxiety    • Blood transfusion without reported diagnosis 2018   • Head ache    • Migraine    • Urinary tract infection        History reviewed. No pertinent surgical history.    Social History     Socioeconomic History   • Marital status: Single     Spouse name: Not on file   • Number of children: Not on file   • Years of education: Not on file   • Highest education level: Not on file   Occupational History   • Not on file   Tobacco Use   • Smoking status: Never Smoker   • Smokeless tobacco: Never Used   Vaping Use   • Vaping Use: Never used   Substance and Sexual Activity   • Alcohol use: Not Currently   • Drug use: Never   • Sexual activity: Yes     Partners: Male, Female     Birth control/protection: Pill   Other Topics Concern   • Not on file   Social History Narrative   • Not on file     Social Determinants of Health     Financial Resource Strain: Not on file   Food Insecurity: Not on file   Transportation Needs: Not on file   Physical Activity: Not on file   Stress: Not on file   Social Connections: Not on file   Intimate Partner Violence: Not on file   Housing Stability: Not on file        Family History   Problem Relation Age of Onset   • Migraines Mother    •  "Autoimmune Disease Mother 42        Susac's    • No Known Problems Father    • No Known Problems Sister    • No Known Problems Brother         Allergies   Allergen Reactions   • Strawberry Anaphylaxis and Hives     Anaphylaxis        Review of Systems   Constitutional: Negative for fever.   HENT: Positive for congestion, ear pain and postnasal drip. Negative for sinus pain and sore throat.    Respiratory: Positive for cough, sputum production, shortness of breath and wheezing. Negative for hemoptysis.    Gastrointestinal: Negative for diarrhea, nausea and vomiting.   Neurological: Negative for headaches.   Endo/Heme/Allergies: Positive for environmental allergies.   All other systems reviewed and are negative.       Objective:   /74   Pulse 90   Temp 36.7 °C (98.1 °F)   Resp 16   Ht 1.575 m (5' 2\")   Wt 113 kg (249 lb)   SpO2 95%   BMI 45.54 kg/m²     Physical Exam  Vitals reviewed.   Constitutional:       General: She is not in acute distress.     Appearance: She is well-developed. She is ill-appearing. She is not toxic-appearing.   HENT:      Head: Normocephalic and atraumatic.      Right Ear: Ear canal and external ear normal. Tympanic membrane is not perforated or erythematous.      Left Ear: Ear canal and external ear normal. Tympanic membrane is not perforated or erythematous.      Nose: Congestion present.      Right Sinus: No maxillary sinus tenderness or frontal sinus tenderness.      Left Sinus: No maxillary sinus tenderness or frontal sinus tenderness.      Comments: White nasal discharge.      Mouth/Throat:      Mouth: Mucous membranes are moist.      Pharynx: Oropharyngeal exudate and posterior oropharyngeal erythema present.      Comments: White post nasal drip.   Eyes:      Conjunctiva/sclera: Conjunctivae normal.   Cardiovascular:      Rate and Rhythm: Normal rate.   Pulmonary:      Effort: Pulmonary effort is normal. No accessory muscle usage, prolonged expiration, respiratory distress " or retractions.      Breath sounds: No stridor. Examination of the right-upper field reveals wheezing. Examination of the left-upper field reveals wheezing. Examination of the right-lower field reveals wheezing. Examination of the left-lower field reveals wheezing. Wheezing present. No decreased breath sounds, rhonchi or rales.      Comments: RR 22  Musculoskeletal:         General: Normal range of motion.      Cervical back: Normal range of motion.   Skin:     General: Skin is warm and dry.      Findings: No rash.   Neurological:      General: No focal deficit present.      Mental Status: She is alert and oriented to person, place, and time.      GCS: GCS eye subscore is 4. GCS verbal subscore is 5. GCS motor subscore is 6.   Psychiatric:         Mood and Affect: Mood normal.         Speech: Speech normal.         Behavior: Behavior normal.         Thought Content: Thought content normal.         Judgment: Judgment normal.         Assessment/Plan:   1. Acute bacterial sinusitis  - doxycycline (VIBRAMYCIN) 100 MG Tab; Take 1 Tablet by mouth 2 times a day for 7 days.  Dispense: 14 Tablet; Refill: 0    2. Bronchospasm with bronchitis, acute  - albuterol (PROVENTIL) 2.5mg/3ml nebulizer solution 2.5 mg  - methylPREDNISolone (MEDROL DOSEPAK) 4 MG Tablet Therapy Pack; Follow schedule on package instructions.  Dispense: 21 Tablet; Refill: 0  - albuterol 108 (90 Base) MCG/ACT Aero Soln inhalation aerosol; Inhale 2 Puffs every 6 hours as needed for Shortness of Breath.  Dispense: 8.5 g; Refill: 0    3. Shortness of breath  - DX-CHEST-2 VIEWS; Future  - albuterol (PROVENTIL) 2.5mg/3ml nebulizer solution 2.5 mg  - POCT Pregnancy  - methylPREDNISolone (MEDROL DOSEPAK) 4 MG Tablet Therapy Pack; Follow schedule on package instructions.  Dispense: 21 Tablet; Refill: 0  - albuterol 108 (90 Base) MCG/ACT Aero Soln inhalation aerosol; Inhale 2 Puffs every 6 hours as needed for Shortness of Breath.  Dispense: 8.5 g; Refill:  0  DX-CHEST-2 VIEWS    Result Date: 4/15/2022  4/15/2022 7:02 PM HISTORY/REASON FOR EXAM: Shortness of breath. TECHNIQUE/EXAM DESCRIPTION AND NUMBER OF VIEWS: Two views of the chest. COMPARISON: None. FINDINGS: There is no evidence of focal consolidation or evidence of pulmonary edema. The heart is normal in size. There is no evidence of pleural effusion. Soft tissues and bony structures are unremarkable.     No evidence of acute cardiopulmonary process.    Symptomatic care.  -Oral hydration and rest.   -Cough control: nonpharmacologic options for cough relief such as throat lozenges, hot tea, honey.  -Over the counter expectorant as directed; Guaifenesin (Mucinex).  -Tylenol or ibuprofen for pain and fever as directed.   -Allergy medications as directed (Zyrtec or Loratadine).  -You may try saline irrigation or neti pot.    -Warm compress to sinuses.     Seek emergency medical care immediately for: Trouble breathing, persistent pain or pressure in the chest, confusion, inability to wake or stay awake, bluish lips or face, persistent tachycardia (fast heart rate), prolonged dizziness, persistent high grade fevers. Follow up for prolonged cough, persistent wheezing, leg swelling, or any other concerns. Follow up with your Primary Care Provider.     Discussed S&S of PNA with follow up. Stable Vitals.    Differential diagnosis, natural history, supportive care, and indications for immediate follow-up discussed.

## 2022-07-16 ENCOUNTER — HOSPITAL ENCOUNTER (OUTPATIENT)
Dept: LAB | Facility: MEDICAL CENTER | Age: 37
End: 2022-07-16
Attending: OBSTETRICS & GYNECOLOGY
Payer: COMMERCIAL

## 2022-07-16 LAB
ABO GROUP BLD: NORMAL
EST. AVERAGE GLUCOSE BLD GHB EST-MCNC: 105 MG/DL
HBA1C MFR BLD: 5.3 % (ref 4–5.6)
PROLACTIN SERPL-MCNC: 12 NG/ML (ref 2.8–26)
RH BLD: NORMAL

## 2022-07-16 PROCEDURE — 36415 COLL VENOUS BLD VENIPUNCTURE: CPT

## 2022-07-16 PROCEDURE — 86900 BLOOD TYPING SEROLOGIC ABO: CPT

## 2022-07-16 PROCEDURE — 83036 HEMOGLOBIN GLYCOSYLATED A1C: CPT

## 2022-07-16 PROCEDURE — 83520 IMMUNOASSAY QUANT NOS NONAB: CPT

## 2022-07-16 PROCEDURE — 86901 BLOOD TYPING SEROLOGIC RH(D): CPT

## 2022-07-16 PROCEDURE — 84146 ASSAY OF PROLACTIN: CPT

## 2022-07-19 LAB — MIS SERPL-MCNC: 6.68 NG/ML (ref 0.18–11.71)

## 2022-08-19 ENCOUNTER — HOSPITAL ENCOUNTER (OUTPATIENT)
Dept: LAB | Facility: MEDICAL CENTER | Age: 37
End: 2022-08-19
Attending: OBSTETRICS & GYNECOLOGY
Payer: COMMERCIAL

## 2022-08-19 LAB — B-HCG SERPL-ACNC: <1 MIU/ML (ref 0–5)

## 2022-08-19 PROCEDURE — 84702 CHORIONIC GONADOTROPIN TEST: CPT

## 2022-08-19 PROCEDURE — 36415 COLL VENOUS BLD VENIPUNCTURE: CPT

## 2022-08-29 ENCOUNTER — OFFICE VISIT (OUTPATIENT)
Dept: URGENT CARE | Facility: PHYSICIAN GROUP | Age: 37
End: 2022-08-29
Payer: COMMERCIAL

## 2022-08-29 VITALS
TEMPERATURE: 97.4 F | OXYGEN SATURATION: 98 % | RESPIRATION RATE: 16 BRPM | DIASTOLIC BLOOD PRESSURE: 82 MMHG | WEIGHT: 249 LBS | BODY MASS INDEX: 45.82 KG/M2 | HEART RATE: 67 BPM | SYSTOLIC BLOOD PRESSURE: 126 MMHG | HEIGHT: 62 IN

## 2022-08-29 DIAGNOSIS — M79.89 PAIN AND SWELLING OF LEFT LOWER LEG: ICD-10-CM

## 2022-08-29 DIAGNOSIS — M79.662 PAIN AND SWELLING OF LEFT LOWER LEG: ICD-10-CM

## 2022-08-29 DIAGNOSIS — M25.562 ACUTE PAIN OF LEFT KNEE: ICD-10-CM

## 2022-08-29 PROCEDURE — 99214 OFFICE O/P EST MOD 30 MIN: CPT | Performed by: PHYSICIAN ASSISTANT

## 2022-08-29 RX ORDER — LETROZOLE 2.5 MG/1
TABLET, FILM COATED ORAL
COMMUNITY
Start: 2022-08-19

## 2022-08-29 RX ORDER — MEDROXYPROGESTERONE ACETATE 10 MG/1
10 TABLET ORAL
COMMUNITY
Start: 2022-07-11

## 2022-08-29 RX ORDER — QUETIAPINE FUMARATE 50 MG/1
50 TABLET, FILM COATED ORAL
COMMUNITY
Start: 2022-08-03

## 2022-08-29 RX ORDER — LORAZEPAM 1 MG/1
TABLET ORAL
COMMUNITY
Start: 2022-07-25

## 2022-08-30 ENCOUNTER — HOSPITAL ENCOUNTER (OUTPATIENT)
Dept: RADIOLOGY | Facility: MEDICAL CENTER | Age: 37
End: 2022-08-30
Attending: PHYSICIAN ASSISTANT
Payer: COMMERCIAL

## 2022-08-30 DIAGNOSIS — M79.89 PAIN AND SWELLING OF LEFT LOWER LEG: ICD-10-CM

## 2022-08-30 DIAGNOSIS — M79.662 PAIN AND SWELLING OF LEFT LOWER LEG: ICD-10-CM

## 2022-08-30 PROCEDURE — 93971 EXTREMITY STUDY: CPT | Mod: LT

## 2022-08-30 ASSESSMENT — ENCOUNTER SYMPTOMS
CHILLS: 0
HEMOPTYSIS: 0
FALLS: 0
PALPITATIONS: 0
FEVER: 0
VOMITING: 0
COUGH: 0
SHORTNESS OF BREATH: 0
MYALGIAS: 1
NAUSEA: 0

## 2022-08-30 NOTE — PROGRESS NOTES
Subjective:   Samantha Benavides is a 36 y.o. female who presents for Knee Pain (No know injury, left knee, swollen, started last night)        Patient presents for evaluation of atraumatic left knee and lower leg pain that began several days ago and has continued to worsen.  Pain is primarily along the posterior aspect of the knee and is worsened by any movement.  Additionally patient notes swelling and states that her wife believes that her left leg looks larger than her right leg.  She has not noted any redness, low back pain, numbness, tingling.  She is otherwise feeling well and denies nausea, vomiting, fevers, chills.  Denies chest pain, shortness of breath, pain with breathing, difficulty breathing, Sadie history of VTE or coagulopathy, recent travel, recent surgery.  Patient tried wearing her wife's knee brace, a cold laser at work as well as Aleve-these provided no relief.    Review of Systems   Constitutional:  Negative for chills, fever and malaise/fatigue.   Respiratory:  Negative for cough, hemoptysis and shortness of breath.    Cardiovascular:  Negative for chest pain and palpitations.   Gastrointestinal:  Negative for nausea and vomiting.   Musculoskeletal:  Positive for joint pain and myalgias. Negative for falls.     PMH:  has a past medical history of Allergy, Anemia, Anxiety, Blood transfusion without reported diagnosis (2018), Head ache, Migraine, and Urinary tract infection.  MEDS:   Current Outpatient Medications:     letrozole (FEMARA) 2.5 MG Tab, TAKE 3 TABLETS BY MOUTH DAILY FOR 7 DAYS, Disp: , Rfl:     LORazepam (ATIVAN) 1 MG Tab, TAKE 1 TABLET BY MOUTH EVERY 6-8 HOURS AS NEEDED FOR ANXIETY, Disp: , Rfl:     QUEtiapine (SEROQUEL) 50 MG tablet, Take 50 mg by mouth every day., Disp: , Rfl:     albuterol 108 (90 Base) MCG/ACT Aero Soln inhalation aerosol, Inhale 2 Puffs every 6 hours as needed for Shortness of Breath., Disp: 8.5 g, Rfl: 0    metFORMIN (GLUCOPHAGE) 500 MG Tab, , Disp: , Rfl:      "sumatriptan (IMITREX) 100 MG tablet, Take 1 tablet po at onset of headache, may repeat dose in 2 hours if unrelieved.  Do not exceed more than 2 tablets in 24 hours., Disp: 9 tablet, Rfl: 11    medroxyPROGESTERone (PROVERA) 10 MG Tab, Take 10 mg by mouth every day. (Patient not taking: Reported on 8/29/2022), Disp: , Rfl:     methylPREDNISolone (MEDROL DOSEPAK) 4 MG Tablet Therapy Pack, Follow schedule on package instructions. (Patient not taking: Reported on 8/29/2022), Disp: 21 Tablet, Rfl: 0    methylPREDNISolone (MEDROL DOSEPAK) 4 MG Tablet Therapy Pack, Follow schedule on package instructions. (Patient not taking: No sig reported), Disp: 21 Tablet, Rfl: 0  ALLERGIES:   Allergies   Allergen Reactions    Strawberry Anaphylaxis and Hives     Anaphylaxis      SURGHX: History reviewed. No pertinent surgical history.  SOCHX:  reports that she has never smoked. She has never used smokeless tobacco. She reports that she does not currently use alcohol. She reports that she does not use drugs.  FH: Family history was reviewed, no pertinent findings to report   Objective:   /82 (BP Location: Right arm, Patient Position: Sitting, BP Cuff Size: Large adult)   Pulse 67   Temp 36.3 °C (97.4 °F) (Temporal)   Resp 16   Ht 1.575 m (5' 2\")   Wt 113 kg (249 lb)   SpO2 98%   BMI 45.54 kg/m²   Physical Exam  Vitals reviewed.   Constitutional:       General: She is not in acute distress.     Appearance: Normal appearance. She is well-developed. She is not toxic-appearing.   HENT:      Head: Normocephalic and atraumatic.      Right Ear: External ear normal.      Left Ear: External ear normal.      Nose: Nose normal.   Cardiovascular:      Rate and Rhythm: Normal rate and regular rhythm.   Pulmonary:      Effort: Pulmonary effort is normal. No respiratory distress.      Breath sounds: No stridor.   Musculoskeletal:      Comments: Diffuse swelling of the right lower extremity from the knee down to the foot.  Left calf " greater in circumference than the right.  No erythema or ecchymosis.  Patella, knee medial and lateral joint lines and ankle nontender to palpation.  Patient is tender to palpation over the posterior aspect of the knee and along the calf.  Patient's left calf feels tight as compared to the right calf.  Lower extremity sensation intact and even bilaterally.  Dorsalis pedis and posterior tibial pulses 2+ and symmetrical bilaterally.   Skin:     General: Skin is dry.   Neurological:      Comments: Alert and oriented.    Psychiatric:         Speech: Speech normal.         Behavior: Behavior normal.       Imaging:  PROCEDURES:   Left lower extremity venous duplex imaging.    The following venous structures were evaluated: common femoral, deep    femoral, proximal great saphenous, femoral, popliteal, peroneal, and    posterior tibial veins.    Serial compression, color, and spectral Doppler flow evaluations were    performed.      FINDINGS:   Left lower extremity-   No evidence of deep venous thrombosis.    The peroneal and posterior tibial veins are difficult to assess for    compressibility, but flow response to augmentation is demonstrated.    All veins demonstrate complete color filling and compressibility with    normal venous flow dynamics including spontaneous flow and respiratory    phasicity.       Flow was evaluated in the contralateral common femoral vein and normal    venous flow dynamics including spontaneous flow and respiratory phasic    variation were demonstrated.  Assessment/Plan:   1. Pain and swelling of left lower leg  - US-EXTREMITY VENOUS LOWER UNILAT LEFT; Future  - Referral to Sports Medicine    2. Acute pain of left knee  - Referral to Sports Medicine    Other orders  - letrozole (FEMARA) 2.5 MG Tab; TAKE 3 TABLETS BY MOUTH DAILY FOR 7 DAYS  - LORazepam (ATIVAN) 1 MG Tab; TAKE 1 TABLET BY MOUTH EVERY 6-8 HOURS AS NEEDED FOR ANXIETY  - medroxyPROGESTERone (PROVERA) 10 MG Tab; Take 10 mg by mouth  every day. (Patient not taking: Reported on 8/29/2022)  - QUEtiapine (SEROQUEL) 50 MG tablet; Take 50 mg by mouth every day.    I am concerned about the possibility of a DVT.  Unfortunately imaging is closed.  Discussed sending patient to the emergency department for further evaluation versus obtaining outpatient ultrasound tomorrow morning.  Patient is not having any chest pain, shortness of breath, hemoptysis, pain with breathing.  Additionally she is afebrile and is not tachycardic on exam today.  Patient advised that a DVT can become life-threatening if it moves from the leg to the lungs.  Patient was advised that if she develops any chest pain, shortness of breath, pain with breathing or worsening symptoms or like her to go to the emergency room for reevaluation and further management.  I feel it is safe to wait until tomorrow morning for exam.  Patient advised that we will contact her around 0830 to inform her of location and time and I will contact her with results following the exam.    -----    Patient did end up attending her ultrasound this morning.  Fortunately there is no evidence of DVT on her exam.  The cause of her symptoms is unclear.  Patient does report improvement in her swelling and pain today.  We will have her follow-up with sports medicine for further evaluation and management.  I would like her to elevate the affected leg, ice, ibuprofen as needed.  All questions and concerns addressed and patient is comfortable with treatment and follow-up plan.  Patient should be reevaluated immediately with any new or worsening symptoms.    Differential diagnosis, natural history, supportive care, and indications for immediate follow-up discussed.

## 2022-08-30 NOTE — PATIENT INSTRUCTIONS
[facility] [address] [place] [address]  Phone: [phone]   Fax: [phone]     Patient Care Coordination notes:  Clean Runnert message with referral info sent/ Faxed referral/ External Ready to schedule

## 2022-09-16 ENCOUNTER — HOSPITAL ENCOUNTER (OUTPATIENT)
Dept: LAB | Facility: MEDICAL CENTER | Age: 37
End: 2022-09-16
Attending: OBSTETRICS & GYNECOLOGY
Payer: COMMERCIAL

## 2022-09-16 LAB — B-HCG SERPL-ACNC: <1 MIU/ML (ref 0–5)

## 2022-09-16 PROCEDURE — 36415 COLL VENOUS BLD VENIPUNCTURE: CPT

## 2022-09-16 PROCEDURE — 84702 CHORIONIC GONADOTROPIN TEST: CPT

## 2022-10-21 ENCOUNTER — HOSPITAL ENCOUNTER (OUTPATIENT)
Dept: LAB | Facility: MEDICAL CENTER | Age: 37
End: 2022-10-21
Attending: OBSTETRICS & GYNECOLOGY
Payer: COMMERCIAL

## 2022-10-21 LAB — B-HCG SERPL-ACNC: <1 MIU/ML (ref 0–5)

## 2022-10-21 PROCEDURE — 36415 COLL VENOUS BLD VENIPUNCTURE: CPT

## 2022-10-21 PROCEDURE — 84702 CHORIONIC GONADOTROPIN TEST: CPT

## 2022-11-23 ENCOUNTER — HOSPITAL ENCOUNTER (OUTPATIENT)
Dept: LAB | Facility: MEDICAL CENTER | Age: 37
End: 2022-11-23
Attending: OBSTETRICS & GYNECOLOGY
Payer: COMMERCIAL

## 2022-11-23 LAB — B-HCG SERPL-ACNC: <1 MIU/ML (ref 0–5)

## 2022-11-23 PROCEDURE — 84702 CHORIONIC GONADOTROPIN TEST: CPT

## 2022-11-23 PROCEDURE — 36415 COLL VENOUS BLD VENIPUNCTURE: CPT

## 2022-12-22 ENCOUNTER — HOSPITAL ENCOUNTER (OUTPATIENT)
Dept: LAB | Facility: MEDICAL CENTER | Age: 37
End: 2022-12-22
Attending: OBSTETRICS & GYNECOLOGY
Payer: COMMERCIAL

## 2022-12-22 LAB — B-HCG SERPL-ACNC: <1 MIU/ML (ref 0–5)

## 2022-12-22 PROCEDURE — 84702 CHORIONIC GONADOTROPIN TEST: CPT

## 2022-12-22 PROCEDURE — 36415 COLL VENOUS BLD VENIPUNCTURE: CPT

## 2023-03-07 ENCOUNTER — HOSPITAL ENCOUNTER (OUTPATIENT)
Dept: LAB | Facility: MEDICAL CENTER | Age: 38
End: 2023-03-07
Attending: OBSTETRICS & GYNECOLOGY
Payer: COMMERCIAL

## 2023-03-07 LAB — B-HCG SERPL-ACNC: 5.8 MIU/ML (ref 0–5)

## 2023-03-07 PROCEDURE — 84702 CHORIONIC GONADOTROPIN TEST: CPT

## 2023-03-07 PROCEDURE — 36415 COLL VENOUS BLD VENIPUNCTURE: CPT

## 2023-03-09 ENCOUNTER — HOSPITAL ENCOUNTER (OUTPATIENT)
Dept: LAB | Facility: MEDICAL CENTER | Age: 38
End: 2023-03-09
Attending: OBSTETRICS & GYNECOLOGY
Payer: COMMERCIAL

## 2023-03-09 LAB — B-HCG SERPL-ACNC: 11.5 MIU/ML (ref 0–5)

## 2023-03-09 PROCEDURE — 36415 COLL VENOUS BLD VENIPUNCTURE: CPT

## 2023-03-09 PROCEDURE — 84702 CHORIONIC GONADOTROPIN TEST: CPT

## 2023-03-13 ENCOUNTER — HOSPITAL ENCOUNTER (OUTPATIENT)
Dept: LAB | Facility: MEDICAL CENTER | Age: 38
End: 2023-03-13
Attending: OBSTETRICS & GYNECOLOGY
Payer: COMMERCIAL

## 2023-03-13 LAB — B-HCG SERPL-ACNC: 36.9 MIU/ML (ref 0–5)

## 2023-03-13 PROCEDURE — 36415 COLL VENOUS BLD VENIPUNCTURE: CPT

## 2023-03-13 PROCEDURE — 84702 CHORIONIC GONADOTROPIN TEST: CPT

## 2023-03-15 ENCOUNTER — HOSPITAL ENCOUNTER (OUTPATIENT)
Dept: LAB | Facility: MEDICAL CENTER | Age: 38
End: 2023-03-15
Attending: OBSTETRICS & GYNECOLOGY
Payer: COMMERCIAL

## 2023-03-15 LAB — B-HCG SERPL-ACNC: 85.1 MIU/ML (ref 0–5)

## 2023-03-15 PROCEDURE — 84702 CHORIONIC GONADOTROPIN TEST: CPT

## 2023-03-15 PROCEDURE — 36415 COLL VENOUS BLD VENIPUNCTURE: CPT

## 2023-03-17 ENCOUNTER — HOSPITAL ENCOUNTER (OUTPATIENT)
Dept: LAB | Facility: MEDICAL CENTER | Age: 38
End: 2023-03-17
Attending: OBSTETRICS & GYNECOLOGY
Payer: COMMERCIAL

## 2023-03-17 LAB — B-HCG SERPL-ACNC: 171 MIU/ML (ref 0–5)

## 2023-03-17 PROCEDURE — 84702 CHORIONIC GONADOTROPIN TEST: CPT

## 2023-03-17 PROCEDURE — 36415 COLL VENOUS BLD VENIPUNCTURE: CPT

## 2023-03-20 ENCOUNTER — HOSPITAL ENCOUNTER (OUTPATIENT)
Dept: LAB | Facility: MEDICAL CENTER | Age: 38
End: 2023-03-20
Attending: OBSTETRICS & GYNECOLOGY
Payer: COMMERCIAL

## 2023-03-20 LAB — B-HCG SERPL-ACNC: 336 MIU/ML (ref 0–5)

## 2023-03-20 PROCEDURE — 84702 CHORIONIC GONADOTROPIN TEST: CPT

## 2023-03-20 PROCEDURE — 36415 COLL VENOUS BLD VENIPUNCTURE: CPT

## 2023-03-24 ENCOUNTER — HOSPITAL ENCOUNTER (OUTPATIENT)
Dept: LAB | Facility: MEDICAL CENTER | Age: 38
End: 2023-03-24
Attending: OBSTETRICS & GYNECOLOGY
Payer: COMMERCIAL

## 2023-03-24 LAB — B-HCG SERPL-ACNC: 767 MIU/ML (ref 0–5)

## 2023-03-24 PROCEDURE — 84702 CHORIONIC GONADOTROPIN TEST: CPT

## 2023-03-24 PROCEDURE — 36415 COLL VENOUS BLD VENIPUNCTURE: CPT

## 2023-03-28 ENCOUNTER — HOSPITAL ENCOUNTER (OUTPATIENT)
Facility: MEDICAL CENTER | Age: 38
End: 2023-03-28
Attending: OBSTETRICS & GYNECOLOGY
Payer: COMMERCIAL

## 2023-03-28 LAB
ALBUMIN SERPL BCP-MCNC: 4 G/DL (ref 3.2–4.9)
ALBUMIN/GLOB SERPL: 1.3 G/DL
ALP SERPL-CCNC: 57 U/L (ref 30–99)
ALT SERPL-CCNC: 20 U/L (ref 2–50)
ANION GAP SERPL CALC-SCNC: 15 MMOL/L (ref 7–16)
AST SERPL-CCNC: 17 U/L (ref 12–45)
B-HCG SERPL-ACNC: 1388 MIU/ML (ref 0–5)
BASOPHILS # BLD AUTO: 0.2 % (ref 0–1.8)
BASOPHILS # BLD: 0.02 K/UL (ref 0–0.12)
BILIRUB SERPL-MCNC: <0.2 MG/DL (ref 0.1–1.5)
BUN SERPL-MCNC: 8 MG/DL (ref 8–22)
CALCIUM ALBUM COR SERPL-MCNC: 9.1 MG/DL (ref 8.5–10.5)
CALCIUM SERPL-MCNC: 9.1 MG/DL (ref 8.5–10.5)
CHLORIDE SERPL-SCNC: 105 MMOL/L (ref 96–112)
CO2 SERPL-SCNC: 17 MMOL/L (ref 20–33)
CREAT SERPL-MCNC: 0.59 MG/DL (ref 0.5–1.4)
EOSINOPHIL # BLD AUTO: 0.06 K/UL (ref 0–0.51)
EOSINOPHIL NFR BLD: 0.7 % (ref 0–6.9)
ERYTHROCYTE [DISTWIDTH] IN BLOOD BY AUTOMATED COUNT: 46.1 FL (ref 35.9–50)
GFR SERPLBLD CREATININE-BSD FMLA CKD-EPI: 120 ML/MIN/1.73 M 2
GLOBULIN SER CALC-MCNC: 3.1 G/DL (ref 1.9–3.5)
GLUCOSE SERPL-MCNC: 92 MG/DL (ref 65–99)
HCT VFR BLD AUTO: 41 % (ref 37–47)
HGB BLD-MCNC: 13.5 G/DL (ref 12–16)
IMM GRANULOCYTES # BLD AUTO: 0.03 K/UL (ref 0–0.11)
IMM GRANULOCYTES NFR BLD AUTO: 0.3 % (ref 0–0.9)
LYMPHOCYTES # BLD AUTO: 2.42 K/UL (ref 1–4.8)
LYMPHOCYTES NFR BLD: 27.9 % (ref 22–41)
MCH RBC QN AUTO: 29.1 PG (ref 27–33)
MCHC RBC AUTO-ENTMCNC: 32.9 G/DL (ref 33.6–35)
MCV RBC AUTO: 88.4 FL (ref 81.4–97.8)
MONOCYTES # BLD AUTO: 0.77 K/UL (ref 0–0.85)
MONOCYTES NFR BLD AUTO: 8.9 % (ref 0–13.4)
NEUTROPHILS # BLD AUTO: 5.38 K/UL (ref 2–7.15)
NEUTROPHILS NFR BLD: 62 % (ref 44–72)
NRBC # BLD AUTO: 0 K/UL
NRBC BLD-RTO: 0 /100 WBC
PLATELET # BLD AUTO: 310 K/UL (ref 164–446)
PMV BLD AUTO: 10.7 FL (ref 9–12.9)
POTASSIUM SERPL-SCNC: 3.6 MMOL/L (ref 3.6–5.5)
PROT SERPL-MCNC: 7.1 G/DL (ref 6–8.2)
RBC # BLD AUTO: 4.64 M/UL (ref 4.2–5.4)
SODIUM SERPL-SCNC: 137 MMOL/L (ref 135–145)
WBC # BLD AUTO: 8.7 K/UL (ref 4.8–10.8)

## 2023-03-28 PROCEDURE — 80053 COMPREHEN METABOLIC PANEL: CPT

## 2023-03-28 PROCEDURE — 84702 CHORIONIC GONADOTROPIN TEST: CPT

## 2023-03-28 PROCEDURE — 85025 COMPLETE CBC W/AUTO DIFF WBC: CPT

## 2023-03-31 ENCOUNTER — HOSPITAL ENCOUNTER (OUTPATIENT)
Dept: LAB | Facility: MEDICAL CENTER | Age: 38
End: 2023-03-31
Attending: OBSTETRICS & GYNECOLOGY
Payer: COMMERCIAL

## 2023-03-31 LAB — B-HCG SERPL-ACNC: 11.7 MIU/ML (ref 0–5)

## 2023-03-31 PROCEDURE — 84702 CHORIONIC GONADOTROPIN TEST: CPT

## 2023-03-31 PROCEDURE — 36415 COLL VENOUS BLD VENIPUNCTURE: CPT

## 2023-04-07 ENCOUNTER — HOSPITAL ENCOUNTER (OUTPATIENT)
Dept: LAB | Facility: MEDICAL CENTER | Age: 38
End: 2023-04-07
Attending: OBSTETRICS & GYNECOLOGY
Payer: COMMERCIAL

## 2023-04-07 LAB
ALBUMIN SERPL BCP-MCNC: 3.6 G/DL (ref 3.2–4.9)
ALBUMIN/GLOB SERPL: 1.1 G/DL
ALP SERPL-CCNC: 63 U/L (ref 30–99)
ALT SERPL-CCNC: 10 U/L (ref 2–50)
ANION GAP SERPL CALC-SCNC: 11 MMOL/L (ref 7–16)
AST SERPL-CCNC: 8 U/L (ref 12–45)
B-HCG SERPL-ACNC: 381 MIU/ML (ref 0–5)
BASOPHILS # BLD AUTO: 0.2 % (ref 0–1.8)
BASOPHILS # BLD: 0.02 K/UL (ref 0–0.12)
BILIRUB SERPL-MCNC: <0.2 MG/DL (ref 0.1–1.5)
BUN SERPL-MCNC: 7 MG/DL (ref 8–22)
CALCIUM ALBUM COR SERPL-MCNC: 8.8 MG/DL (ref 8.5–10.5)
CALCIUM SERPL-MCNC: 8.5 MG/DL (ref 8.5–10.5)
CHLORIDE SERPL-SCNC: 108 MMOL/L (ref 96–112)
CO2 SERPL-SCNC: 19 MMOL/L (ref 20–33)
CREAT SERPL-MCNC: 0.66 MG/DL (ref 0.5–1.4)
EOSINOPHIL # BLD AUTO: 0.08 K/UL (ref 0–0.51)
EOSINOPHIL NFR BLD: 0.9 % (ref 0–6.9)
ERYTHROCYTE [DISTWIDTH] IN BLOOD BY AUTOMATED COUNT: 46.7 FL (ref 35.9–50)
GFR SERPLBLD CREATININE-BSD FMLA CKD-EPI: 117 ML/MIN/1.73 M 2
GLOBULIN SER CALC-MCNC: 3.4 G/DL (ref 1.9–3.5)
GLUCOSE SERPL-MCNC: 84 MG/DL (ref 65–99)
HCT VFR BLD AUTO: 38.7 % (ref 37–47)
HGB BLD-MCNC: 13.1 G/DL (ref 12–16)
IMM GRANULOCYTES # BLD AUTO: 0.02 K/UL (ref 0–0.11)
IMM GRANULOCYTES NFR BLD AUTO: 0.2 % (ref 0–0.9)
LYMPHOCYTES # BLD AUTO: 2.45 K/UL (ref 1–4.8)
LYMPHOCYTES NFR BLD: 28.1 % (ref 22–41)
MCH RBC QN AUTO: 29.8 PG (ref 27–33)
MCHC RBC AUTO-ENTMCNC: 33.9 G/DL (ref 33.6–35)
MCV RBC AUTO: 88 FL (ref 81.4–97.8)
MONOCYTES # BLD AUTO: 0.6 K/UL (ref 0–0.85)
MONOCYTES NFR BLD AUTO: 6.9 % (ref 0–13.4)
NEUTROPHILS # BLD AUTO: 5.55 K/UL (ref 2–7.15)
NEUTROPHILS NFR BLD: 63.7 % (ref 44–72)
NRBC # BLD AUTO: 0 K/UL
NRBC BLD-RTO: 0 /100 WBC
PLATELET # BLD AUTO: 302 K/UL (ref 164–446)
PMV BLD AUTO: 10.4 FL (ref 9–12.9)
POTASSIUM SERPL-SCNC: 3.6 MMOL/L (ref 3.6–5.5)
PROT SERPL-MCNC: 7 G/DL (ref 6–8.2)
RBC # BLD AUTO: 4.4 M/UL (ref 4.2–5.4)
SODIUM SERPL-SCNC: 138 MMOL/L (ref 135–145)
WBC # BLD AUTO: 8.7 K/UL (ref 4.8–10.8)

## 2023-04-07 PROCEDURE — 80053 COMPREHEN METABOLIC PANEL: CPT

## 2023-04-07 PROCEDURE — 84702 CHORIONIC GONADOTROPIN TEST: CPT

## 2023-04-07 PROCEDURE — 85025 COMPLETE CBC W/AUTO DIFF WBC: CPT

## 2023-04-07 PROCEDURE — 36415 COLL VENOUS BLD VENIPUNCTURE: CPT

## 2023-04-14 ENCOUNTER — HOSPITAL ENCOUNTER (OUTPATIENT)
Dept: LAB | Facility: MEDICAL CENTER | Age: 38
End: 2023-04-14
Attending: OBSTETRICS & GYNECOLOGY
Payer: COMMERCIAL

## 2023-04-14 LAB — B-HCG SERPL-ACNC: 9.8 MIU/ML (ref 0–5)

## 2023-04-14 PROCEDURE — 84702 CHORIONIC GONADOTROPIN TEST: CPT

## 2023-04-14 PROCEDURE — 36415 COLL VENOUS BLD VENIPUNCTURE: CPT

## 2023-04-21 ENCOUNTER — HOSPITAL ENCOUNTER (OUTPATIENT)
Dept: LAB | Facility: MEDICAL CENTER | Age: 38
End: 2023-04-21
Attending: OBSTETRICS & GYNECOLOGY
Payer: COMMERCIAL

## 2023-04-21 LAB
ALBUMIN SERPL BCP-MCNC: 3.9 G/DL (ref 3.2–4.9)
ALBUMIN/GLOB SERPL: 1.3 G/DL
ALP SERPL-CCNC: 63 U/L (ref 30–99)
ALT SERPL-CCNC: 11 U/L (ref 2–50)
ANION GAP SERPL CALC-SCNC: 12 MMOL/L (ref 7–16)
AST SERPL-CCNC: 9 U/L (ref 12–45)
B-HCG SERPL-ACNC: 1.7 MIU/ML (ref 0–5)
BASOPHILS # BLD AUTO: 0.5 % (ref 0–1.8)
BASOPHILS # BLD: 0.04 K/UL (ref 0–0.12)
BILIRUB SERPL-MCNC: 0.2 MG/DL (ref 0.1–1.5)
BUN SERPL-MCNC: 8 MG/DL (ref 8–22)
CALCIUM ALBUM COR SERPL-MCNC: 9 MG/DL (ref 8.5–10.5)
CALCIUM SERPL-MCNC: 8.9 MG/DL (ref 8.5–10.5)
CHLORIDE SERPL-SCNC: 105 MMOL/L (ref 96–112)
CO2 SERPL-SCNC: 23 MMOL/L (ref 20–33)
CREAT SERPL-MCNC: 0.78 MG/DL (ref 0.5–1.4)
EOSINOPHIL # BLD AUTO: 0.17 K/UL (ref 0–0.51)
EOSINOPHIL NFR BLD: 2.3 % (ref 0–6.9)
ERYTHROCYTE [DISTWIDTH] IN BLOOD BY AUTOMATED COUNT: 46.9 FL (ref 35.9–50)
GFR SERPLBLD CREATININE-BSD FMLA CKD-EPI: 101 ML/MIN/1.73 M 2
GLOBULIN SER CALC-MCNC: 3.1 G/DL (ref 1.9–3.5)
GLUCOSE SERPL-MCNC: 90 MG/DL (ref 65–99)
HCT VFR BLD AUTO: 41.9 % (ref 37–47)
HGB BLD-MCNC: 14 G/DL (ref 12–16)
IMM GRANULOCYTES # BLD AUTO: 0.03 K/UL (ref 0–0.11)
IMM GRANULOCYTES NFR BLD AUTO: 0.4 % (ref 0–0.9)
LYMPHOCYTES # BLD AUTO: 2.92 K/UL (ref 1–4.8)
LYMPHOCYTES NFR BLD: 39 % (ref 22–41)
MCH RBC QN AUTO: 29.7 PG (ref 27–33)
MCHC RBC AUTO-ENTMCNC: 33.4 G/DL (ref 33.6–35)
MCV RBC AUTO: 89 FL (ref 81.4–97.8)
MONOCYTES # BLD AUTO: 0.61 K/UL (ref 0–0.85)
MONOCYTES NFR BLD AUTO: 8.2 % (ref 0–13.4)
NEUTROPHILS # BLD AUTO: 3.71 K/UL (ref 2–7.15)
NEUTROPHILS NFR BLD: 49.6 % (ref 44–72)
NRBC # BLD AUTO: 0 K/UL
NRBC BLD-RTO: 0 /100 WBC
PLATELET # BLD AUTO: 345 K/UL (ref 164–446)
PMV BLD AUTO: 10.4 FL (ref 9–12.9)
POTASSIUM SERPL-SCNC: 3.9 MMOL/L (ref 3.6–5.5)
PROT SERPL-MCNC: 7 G/DL (ref 6–8.2)
RBC # BLD AUTO: 4.71 M/UL (ref 4.2–5.4)
SODIUM SERPL-SCNC: 140 MMOL/L (ref 135–145)
WBC # BLD AUTO: 7.5 K/UL (ref 4.8–10.8)

## 2023-04-21 PROCEDURE — 80053 COMPREHEN METABOLIC PANEL: CPT

## 2023-04-21 PROCEDURE — 36415 COLL VENOUS BLD VENIPUNCTURE: CPT

## 2023-04-21 PROCEDURE — 85025 COMPLETE CBC W/AUTO DIFF WBC: CPT

## 2023-04-21 PROCEDURE — 84702 CHORIONIC GONADOTROPIN TEST: CPT

## 2025-02-17 NOTE — PROGRESS NOTES
Peds/Neuro Ophthalmology:   Slim Peres M.D.    Date & Time note created:    1/27/2021   2:16 PM     Referring MD / APRN:  Melecio Cheema M.D., No att. providers found    Patient ID:  Name:             Samantha Benavides     YOB: 1985  Age:                 35 y.o.  female   MRN:               [id number]    Chief Complaint/Reason for Visit:     Other (visual disturbance)      History of Present Illness:    Samantha Benavides is a 35 y.o. female   Pt referred by Rosie STEVENSON for visual disturbance.Vision like looking at snow on TV.since childhood.+ glare at night for years.Migraines for years.After images x 1 year.Pts mom has susacs vascular dz.Lives in [place]. Vision good with glasses and no double vision.MRI done last December at [facility]. History of migraine, episodes 4 times per week. Seen in Neurology for  emigraines.       Review of Systems:  Review of Systems   Eyes: Positive for photophobia.        Visual disturbance   Neurological: Positive for headaches.   All other systems reviewed and are negative.      Past Medical History:   Past Medical History:   Diagnosis Date   • Allergy    • Anemia    • Anxiety    • Blood transfusion without reported diagnosis 2018   • Head ache    • Migraine    • Urinary tract infection        Past Surgical History:  History reviewed. No pertinent surgical history.    Current Outpatient Medications:  Current Outpatient Medications   Medication Sig Dispense Refill   • triamcinolone acetonide (KENALOG) 0.1 % Cream Apply to rash on elbow areas twice daily for 2 weeks.  May restart if rash reappears. 45 g 0   • SUMAtriptan (IMITREX) 25 MG Tab tablet Take 1 Tab by mouth Once PRN for Migraine for up to 1 dose. Repeat once in 2 hours if needed 10 Tab 3   • ferrous sulfate 325 (65 Fe) MG tablet Take 325 mg by mouth.     • drospirenone-ethinyl estradiol (HAIDER) 3-0.03 MG per tablet Take 1 Tab by mouth every day.     • verapamil SR (CALAN-SR)  Prep Survey      Flowsheet Row Responses   [facility] facility patient discharged from? Non-BH   Is LACE score < 7 ? Non-BH Discharge   Eligibility Not Eligible   What are the reasons patient is not eligible? Other  [not an inpatient visit]   Does the patient have one of the following disease processes/diagnoses(primary or secondary)? Other   Prep survey completed? Yes            Deann Hernandez Registered Nurse           120 MG CR tablet Take 1 Tab by mouth every day. (Patient not taking: Reported on 1/27/2021) 90 Tab 3   • thiamine (THIAMINE) 50 MG Tab Take 50 mg by mouth every day.     • M.T.E. -5 Adult -500-60 MCG/ML injection by Intravenous route.     • magnesium oxide (MAG-OX) 400 MG Tab tablet Take 400 mg by mouth every day.       No current facility-administered medications for this visit.        Allergies:  Allergies   Allergen Reactions   • Strawberry Anaphylaxis and Hives     Anaphylaxis        Family History:  Family History   Problem Relation Age of Onset   • Migraines Mother    • Autoimmune Disease Mother 42        Susac's    • No Known Problems Father    • No Known Problems Sister    • No Known Problems Brother        Social History:  Social History     Socioeconomic History   • Marital status: Single     Spouse name: Not on file   • Number of children: Not on file   • Years of education: Not on file   • Highest education level: Not on file   Occupational History   • Not on file   Social Needs   • Financial resource strain: Not on file   • Food insecurity     Worry: Not on file     Inability: Not on file   • Transportation needs     Medical: Not on file     Non-medical: Not on file   Tobacco Use   • Smoking status: Never Smoker   • Smokeless tobacco: Never Used   Substance and Sexual Activity   • Alcohol use: Not Currently     Frequency: Monthly or less     Drinks per session: 1 or 2     Binge frequency: Never   • Drug use: Never   • Sexual activity: Yes     Partners: Male, Female     Birth control/protection: Pill   Lifestyle   • Physical activity     Days per week: Not on file     Minutes per session: Not on file   • Stress: Not on file   Relationships   • Social connections     Talks on phone: Not on file     Gets together: Not on file     Attends [employer] service: Not on file     Active member of club or organization: Not on file     Attends meetings of clubs or organizations: Not on file     Relationship  status: Not on file   • Intimate partner violence     Fear of current or ex partner: Not on file     Emotionally abused: Not on file     Physically abused: Not on file     Forced sexual activity: Not on file   Other Topics Concern   • Not on file   Social History Narrative   • Not on file          Physical Exam:  Physical Exam    Oriented x 3  Weight/BMI: There is no height or weight on file to calculate BMI.  There were no vitals taken for this visit.    Base Eye Exam     Visual Acuity (Snellen - Linear)       Right Left    Dist cc 20/15-2 20/15    Near cc J1+ J1+    Correction: Glasses          Tonometry (i care, 1:08 PM)       Right Left    Pressure 22 23          Pupils       Pupils    Right PERRL    Left PERRL          Visual Fields       Right Left     Full Full          Extraocular Movement       Right Left     Full, Ortho Full, Ortho          Neuro/Psych     Oriented x3: Yes    Mood/Affect: Normal          Dilation     Both eyes: Tropicamide (MYDRIACYL) 1% ophthalmic solution, Phenylephrine (NEOSYNEPHRINE) ophthalmic solution 2.5% @ 1:25 PM            Additional Tests     Color       Right Left    Ishihara 9/9 9/9          Stereo     Fly: +    Animals: 3/3    Circles: 8/9            Slit Lamp and Fundus Exam     External Exam       Right Left    External Normal Normal          Slit Lamp Exam       Right Left    Lids/Lashes Normal Normal    Conjunctiva/Sclera White and quiet White and quiet    Cornea Clear Clear    Anterior Chamber Deep and quiet Deep and quiet    Iris Round and reactive Round and reactive    Lens Clear Clear    Vitreous Normal Normal          Fundus Exam       Right Left    Disc Tilted disc Tilted disc    Macula Normal Normal    Vessels Normal Normal    Periphery Normal Normal            Refraction     Wearing Rx       Sphere Cylinder Axis    Right -0.75 +0.75 082    Left -1.25 +1.25 112    Age: 3m    Type: SVL          Manifest Refraction (Auto)       Sphere Cylinder Axis    Right -0.25  +0.75 089    Left -0.75 +1.00 111          Cycloplegic Refraction (Auto)       Sphere Cylinder Axis    Right -0.25 +1.00 096    Left -0.75 +1.25 110                Pertinent Lab/Test/Imaging Review:      Assessment and Plan:     Visual snow syndrome  1/27/2021- Visual snow. MRI unremarkable. Discussed that this is recognizing the normal entopic phenomenon. It occurs most frequently in patients with a history of migraine. It is very difficult to eliminate completely, but sometimes becomes less recognizable with migraine treatments. No evidence of Susac syndrome. Also discussed FL-41 lenses    Congenital tilted optic nerve (HCC)  1/27/2021 - OCT NFL thickness 93 OD and 81 OS, somewhat anomalous nerves with temporal crescent, tilting and situs inversus.     Migraine with aura and without status migrainosus, not intractable  1/27/2021 - with visual snow discussed FL-41 lenses, also [email or web address]    Regular astigmatism of both eyes  1/27/2021 - doing well with current rx        Slim Peres M.D.